# Patient Record
Sex: MALE | Race: WHITE | Employment: OTHER | URBAN - NONMETROPOLITAN AREA
[De-identification: names, ages, dates, MRNs, and addresses within clinical notes are randomized per-mention and may not be internally consistent; named-entity substitution may affect disease eponyms.]

---

## 2019-08-13 RX ORDER — LANCETS 30 GAUGE
1 EACH MISCELLANEOUS DAILY
COMMUNITY

## 2019-08-13 RX ORDER — FUROSEMIDE 20 MG/1
20 TABLET ORAL DAILY
COMMUNITY
End: 2021-11-11

## 2019-08-13 RX ORDER — GLIMEPIRIDE 2 MG/1
2 TABLET ORAL
COMMUNITY
End: 2023-05-25

## 2019-08-13 RX ORDER — ATORVASTATIN CALCIUM 10 MG/1
10 TABLET, FILM COATED ORAL DAILY
COMMUNITY

## 2019-08-13 RX ORDER — PEN NEEDLE, DIABETIC 31 G X1/4"
1 NEEDLE, DISPOSABLE MISCELLANEOUS DAILY
COMMUNITY

## 2019-08-13 RX ORDER — METOPROLOL TARTRATE 50 MG/1
50 TABLET, FILM COATED ORAL 2 TIMES DAILY
COMMUNITY
End: 2021-11-11

## 2019-08-27 ENCOUNTER — OFFICE VISIT (OUTPATIENT)
Dept: NEUROLOGY | Age: 74
End: 2019-08-27
Payer: MEDICARE

## 2019-08-27 VITALS
BODY MASS INDEX: 40.77 KG/M2 | HEART RATE: 84 BPM | HEIGHT: 68 IN | DIASTOLIC BLOOD PRESSURE: 70 MMHG | WEIGHT: 269 LBS | SYSTOLIC BLOOD PRESSURE: 130 MMHG

## 2019-08-27 DIAGNOSIS — R20.0 NUMBNESS AND TINGLING: ICD-10-CM

## 2019-08-27 DIAGNOSIS — R20.2 NUMBNESS AND TINGLING: ICD-10-CM

## 2019-08-27 DIAGNOSIS — I10 ESSENTIAL HYPERTENSION: ICD-10-CM

## 2019-08-27 DIAGNOSIS — G63 POLYNEUROPATHY ASSOCIATED WITH UNDERLYING DISEASE (HCC): ICD-10-CM

## 2019-08-27 DIAGNOSIS — R29.898 WEAKNESS OF RIGHT LOWER EXTREMITY: ICD-10-CM

## 2019-08-27 DIAGNOSIS — G93.89 OTHER SPECIFIED DISORDERS OF BRAIN: ICD-10-CM

## 2019-08-27 DIAGNOSIS — R41.3 MEMORY PROBLEM: ICD-10-CM

## 2019-08-27 DIAGNOSIS — I50.9 CONGESTIVE HEART FAILURE, UNSPECIFIED HF CHRONICITY, UNSPECIFIED HEART FAILURE TYPE (HCC): ICD-10-CM

## 2019-08-27 DIAGNOSIS — C64.9 MALIGNANT NEOPLASM OF KIDNEY, UNSPECIFIED LATERALITY (HCC): ICD-10-CM

## 2019-08-27 DIAGNOSIS — Z91.81 H/O FALLING: ICD-10-CM

## 2019-08-27 DIAGNOSIS — K21.9 GASTROESOPHAGEAL REFLUX DISEASE WITHOUT ESOPHAGITIS: ICD-10-CM

## 2019-08-27 DIAGNOSIS — E11.8 TYPE 2 DIABETES MELLITUS WITH COMPLICATION, WITHOUT LONG-TERM CURRENT USE OF INSULIN (HCC): ICD-10-CM

## 2019-08-27 DIAGNOSIS — I67.82 CHRONIC CEREBRAL ISCHEMIA: ICD-10-CM

## 2019-08-27 DIAGNOSIS — R26.89 BALANCE PROBLEM: ICD-10-CM

## 2019-08-27 DIAGNOSIS — M54.16 CHRONIC LUMBAR RADICULOPATHY: Primary | ICD-10-CM

## 2019-08-27 DIAGNOSIS — E66.09 CLASS 2 OBESITY DUE TO EXCESS CALORIES WITHOUT SERIOUS COMORBIDITY WITH BODY MASS INDEX (BMI) OF 35.0 TO 35.9 IN ADULT: ICD-10-CM

## 2019-08-27 DIAGNOSIS — I48.91 ATRIAL FIBRILLATION, UNSPECIFIED TYPE (HCC): ICD-10-CM

## 2019-08-27 PROBLEM — E66.812 CLASS 2 OBESITY DUE TO EXCESS CALORIES WITHOUT SERIOUS COMORBIDITY WITH BODY MASS INDEX (BMI) OF 35.0 TO 35.9 IN ADULT: Status: ACTIVE | Noted: 2019-08-27

## 2019-08-27 PROBLEM — E11.9 TYPE 2 DIABETES MELLITUS (HCC): Status: ACTIVE | Noted: 2019-08-27

## 2019-08-27 PROCEDURE — 99205 OFFICE O/P NEW HI 60 MIN: CPT | Performed by: PSYCHIATRY & NEUROLOGY

## 2019-08-27 ASSESSMENT — ENCOUNTER SYMPTOMS
VOMITING: 0
SINUS PRESSURE: 0
NAUSEA: 0
COUGH: 0
FACIAL SWELLING: 0
COLOR CHANGE: 0
SHORTNESS OF BREATH: 0
BLOOD IN STOOL: 0
APNEA: 0
EYE REDNESS: 0
CHOKING: 0
WHEEZING: 0
VOICE CHANGE: 0
TROUBLE SWALLOWING: 0
EYE DISCHARGE: 0
DIARRHEA: 0
SORE THROAT: 0
EYE PAIN: 0
ABDOMINAL PAIN: 0
EYE ITCHING: 0
BACK PAIN: 1
ABDOMINAL DISTENTION: 0
CONSTIPATION: 0
BOWEL INCONTINENCE: 0
CHEST TIGHTNESS: 0
PHOTOPHOBIA: 0
VISUAL CHANGE: 0

## 2019-08-27 NOTE — PATIENT INSTRUCTIONS
* FALL   PRECAUTIONS. *  USE   WALKING  ASSISTANCE  DEVICES     QUAD  CANE       *    TO   AVOID   TO  SLEEP  IN   SUPINE  POSITION. *      WEIGHT   LOSS. *   ADEQUATE   FLUID  INTAKE   AND  ELECTROLYTE  BALANCE           * KEEP  DAIRY  OF   THE  NEUROLOGICAL  SYMPTOMS          *  TO  MAINTAIN  REGULAR  SLEEP  WAKE  CYCLES. *   TO  HAVE  ADEQUATE  REST  AND   SLEEP    HOURS.        *    AVOID  USAGE OF   TOBACCO,  EXCESSIVE  ALCOHOL                AND   ILLEGAL   SUBSTANCES,  IF  ANY          *  Maintain   Healthy  Life Style    With   Periodic  Monitoring  Of      Any  Medical  Conditions  Including   Elevated  Blood  Pressure,  Lipid  Profile,     Blood  Sugar levels  And   Heart  Disease. *   Period   Screening  For  Cancers  Involving  Breast,  Colon,   lungs  And  Other  Organs  As  Applicable,  In consultation   With  Your  Primary Care Providers. *  If   There is  Any  Significant  Worsening   Of  Current  Symptoms  And  Or  If    Any additional  New  Neurological  Symptoms                 Or  Significant  Concerns   Should  Call  911 or  Go  To  Emergency  Department  For  Further  Immediate  Evaluation.

## 2019-08-27 NOTE — PROGRESS NOTES
History  Of   Visual  Symptoms    absent                  Associated   Diplopia       absent                                               Also   Additional   Symptoms   Present    As  Documented    In   The   detailed                  Review  Of  Systems   And    Please   Refer   To    Them for   Additional    Information. Any components  That are either  Unobtainable  Or  Limited  In   HPI, ROS  And/or PFSH   Are                   Due   ToPatient's  Medical  Problems,  Clinical  Condition   and/or lack of                                other    Alternate   resources. RECORDS   REVIEWED:    historical medical records       INFORMATION   REVIEWED:     MEDICAL   HISTORY,SURGICAL   HISTORY,     MEDICATIONS   LIST,   ALLERGIES AND  DRUG  INTOLERANCES,       FAMILY   HISTORY,  SOCIAL  HISTORY,      PROBLEM  LIST   FOR  PATIENT  CARE   COORDINATION      Past Medical History:   Diagnosis Date    [facility]     [facility]     Stage 3    CH[facility]congestive heart failure) [facility] 2018    GERD (gastroesophageal reflux disease)     Hypertension     Type 2 diabetes mellitus ([facility]          Past Surgical History:   Procedure Laterality Date    APPENDECTOMY      CIRCUMCISION  2014    FOOT FRACTURE SURGERY      Right foot, Broken ankle    TONSILLECTOMY           Current Outpatient Medications   Medication Sig Dispense Refill    atorvastatin (LIPITOR) 10 MG tablet Take 10 mg by mouth daily Half tablet daily      apixaban (ELIQUIS) 5 MG TABS tablet Take 5 mg by mouth 2 times daily      furosemide (LASIX) 20 MG tablet Take 20 mg by mouth daily      metoprolol tartrate (LOPRESSOR) 50 MG tablet Take 50 mg by mouth 2 times daily      ROSIGLITAZONE-GLIMEPIRIDE PO Take 45 mg by mouth daily      blood glucose test strips (GMATE BLOOD GLUCOSE TEST) strip 1 each by In Vitro route daily As needed.       Lancets MISC 1 each by Does not apply route daily      Insulin Pen Procedures    MRI Brain WO Contrast    VL DUP CAROTID BILATERAL    Sedimentation Rate    CBC    IRMA Screen with Reflex    Rheumatoid Factor    Hemoglobin A1C    TSH without Reflex    Vitamin B12 & Folate    EEG    EMG    DE TRANSCRAN DOPPLER INTRACRAN ART                             *  PATIENT  TO  RETURN  THE  CLINIC  AFTER   ABOVE,                       FOR   FURTHER    RE EVALUATIONS                               AND  ADDITIONAL  RECOMMENDATIONS ,                        INCLUDING  MEDICAL  MANAGEMENT,                        AS   CLINICALLY    INDICATED. *PATIENT   TO  FOLLOW  UP  WITH   PRIMARY  CARE         OTHER  CONSULTANTS  AS  BEFORE. *  Maintain   Healthy  Life Style    With   Periodic  Monitoring  Of      Any  Medical  Conditions  Including   Elevated  Blood  Pressure,  Lipid  Profile,     Blood  Sugar levels  AndHeart  Disease. *   Period   Screening  For  Cancers  Involving  Breast,  Colon,    lungs  And  Other  Organs  As  Applicable,  In consultation   With  Your  Primary Care Providers. *Second  Neurological  Opinion  And  Evaluations  In  [facility]  Setting  If  Patient  Is  Interested. * Please   Contact   Neurology  Clinic   Early   If   Are  Any  New  Neurological   And  Any neurological  Concerns. *  If  The  Patient remains  Neurologically  Stable   Return   To  [facility] Neurology Department   IN    1-2      MONTHS  TIME   FOR  FURTHER              FOLLOW UP.                       *   The  Neurological   Findings,  Possible  Diagnosis,  Differential diagnoses                    And  Options  For    Further   Investigations                   And  management   Are  Discussed  Comprehensively. Medications   And  Prescription   Risks  And  Side effects  Are   Also  Discussed.                      *  If   There is  Any to 2 drinks a day for men, 1drink a day for women) are okay. But drinking too much can lead to liver problems, high blood pressure, and other health problems.  health   If you have a family, there are many things you can do together to improve your health.  Eat meals together as a family as often as possible.  Eat healthy foods. This includes fruits, vegetables, lean meats and dairy, and whole grains.  Include your family in your fitness plan. Most peoplethink of activities such as jogging or tennis as the way to fitness, but there are many ways you and your family can be more active. Anything that makes you breathe hard and gets your heart pumping is exercise. Here are sometips:   Walk to do errands or to take your child to school or the bus.  Go for a family bike ride after dinner instead of watching TV.  Where can you learn more?  Go toSavoy [email or web address] and sign in to your [employer] account. Enter N026 in the Search HealthInformation box to learn more about \"A Healthy Lifestyle: Care Instructions. \"     If you do not have anaccount, please click on the \"Sign Up Now\" link.  Current as of: July 26, 2016   Content Version: 11.2   © 5631-1748 [employer]. Care instructions adapted under license by [facility] ([facility]). If you have questions about a medical condition or this instruction, always ask your healthcare professional. invi disclaims any warranty or liability for your use of this information.

## 2019-09-09 ENCOUNTER — HOSPITAL ENCOUNTER (OUTPATIENT)
Dept: LAB | Age: 74
Discharge: HOME | End: 2019-09-09
Payer: MEDICARE

## 2019-09-09 DIAGNOSIS — I67.82 CHRONIC CEREBRAL ISCHEMIA: ICD-10-CM

## 2019-09-09 DIAGNOSIS — R26.89 BALANCE PROBLEM: ICD-10-CM

## 2019-09-09 DIAGNOSIS — Z91.81 H/O FALLING: ICD-10-CM

## 2019-09-09 DIAGNOSIS — R41.3 MEMORY PROBLEM: ICD-10-CM

## 2019-09-09 LAB
ESTIMATED AVERAGE GLUCOSE: 151 MG/DL
FOLATE: >20 NG/ML
HBA1C MFR BLD: 6.9 % (ref 4.8–5.9)
HCT VFR BLD CALC: 44.4 % (ref 41–53)
HEMOGLOBIN: 14.7 G/DL (ref 13.5–17.5)
MCH RBC QN AUTO: 31.4 PG (ref 26–34)
MCHC RBC AUTO-ENTMCNC: 33.2 G/DL (ref 31–37)
MCV RBC AUTO: 94.5 FL (ref 80–100)
NRBC AUTOMATED: ABNORMAL PER 100 WBC
PDW BLD-RTO: 16.3 % (ref 11–14.5)
PLATELET # BLD: 163 K/UL (ref 140–450)
PMV BLD AUTO: 9.2 FL (ref 6–12)
RBC # BLD: 4.69 M/UL (ref 4.5–5.9)
RHEUMATOID FACTOR: <10 IU/ML
SEDIMENTATION RATE, ERYTHROCYTE: 9 MM (ref 0–20)
TSH SERPL DL<=0.05 MIU/L-ACNC: 3.66 MIU/L (ref 0.3–5)
VITAMIN B-12: 328 PG/ML (ref 232–1245)
WBC # BLD: 7 K/UL (ref 3.5–11)

## 2019-09-09 PROCEDURE — 82746 ASSAY OF FOLIC ACID SERUM: CPT

## 2019-09-09 PROCEDURE — 85027 COMPLETE CBC AUTOMATED: CPT

## 2019-09-09 PROCEDURE — 83036 HEMOGLOBIN GLYCOSYLATED A1C: CPT

## 2019-09-09 PROCEDURE — 85651 RBC SED RATE NONAUTOMATED: CPT

## 2019-09-09 PROCEDURE — 86431 RHEUMATOID FACTOR QUANT: CPT

## 2019-09-09 PROCEDURE — 36415 COLL VENOUS BLD VENIPUNCTURE: CPT

## 2019-09-09 PROCEDURE — 84443 ASSAY THYROID STIM HORMONE: CPT

## 2019-09-09 PROCEDURE — 82607 VITAMIN B-12: CPT

## 2019-09-09 PROCEDURE — 86038 ANTINUCLEAR ANTIBODIES: CPT

## 2019-09-10 LAB — ANTI-NUCLEAR ANTIBODY (ANA): NEGATIVE

## 2019-09-13 ENCOUNTER — HOSPITAL ENCOUNTER (OUTPATIENT)
Dept: MRI IMAGING | Age: 74
Discharge: HOME | End: 2019-09-15
Payer: MEDICARE

## 2019-09-13 ENCOUNTER — HOSPITAL ENCOUNTER (OUTPATIENT)
Dept: INTERVENTIONAL RADIOLOGY/VASCULAR | Age: 74
Discharge: HOME | End: 2019-09-15
Payer: MEDICARE

## 2019-09-13 DIAGNOSIS — Z91.81 H/O FALLING: ICD-10-CM

## 2019-09-13 DIAGNOSIS — R26.89 BALANCE PROBLEM: ICD-10-CM

## 2019-09-13 DIAGNOSIS — I67.82 CHRONIC CEREBRAL ISCHEMIA: ICD-10-CM

## 2019-09-13 DIAGNOSIS — R41.3 MEMORY PROBLEM: ICD-10-CM

## 2019-09-13 PROCEDURE — 93880 EXTRACRANIAL BILAT STUDY: CPT

## 2019-09-13 PROCEDURE — 70551 MRI BRAIN STEM W/O DYE: CPT

## 2019-09-18 ENCOUNTER — HOSPITAL ENCOUNTER (OUTPATIENT)
Dept: NEUROLOGY | Age: 74
Discharge: HOME | End: 2019-09-18
Payer: MEDICARE

## 2019-09-18 DIAGNOSIS — R26.89 BALANCE PROBLEM: ICD-10-CM

## 2019-09-18 DIAGNOSIS — R20.0 NUMBNESS AND TINGLING: ICD-10-CM

## 2019-09-18 DIAGNOSIS — R41.3 MEMORY PROBLEM: ICD-10-CM

## 2019-09-18 DIAGNOSIS — R29.898 WEAKNESS OF RIGHT LOWER EXTREMITY: ICD-10-CM

## 2019-09-18 DIAGNOSIS — R20.2 NUMBNESS AND TINGLING: ICD-10-CM

## 2019-09-18 DIAGNOSIS — I67.82 CHRONIC CEREBRAL ISCHEMIA: ICD-10-CM

## 2019-09-18 DIAGNOSIS — Z91.81 H/O FALLING: ICD-10-CM

## 2019-09-18 PROCEDURE — 95886 MUSC TEST DONE W/N TEST COMP: CPT

## 2019-09-18 PROCEDURE — 95911 NRV CNDJ TEST 9-10 STUDIES: CPT

## 2019-10-03 ENCOUNTER — HOSPITAL ENCOUNTER (OUTPATIENT)
Dept: NEUROLOGY | Age: 74
Discharge: HOME | End: 2019-10-03
Payer: MEDICARE

## 2019-10-03 DIAGNOSIS — I50.9 CONGESTIVE HEART FAILURE, UNSPECIFIED HF CHRONICITY, UNSPECIFIED HEART FAILURE TYPE (HCC): ICD-10-CM

## 2019-10-03 DIAGNOSIS — G93.89 OTHER SPECIFIED DISORDERS OF BRAIN: ICD-10-CM

## 2019-10-03 DIAGNOSIS — E11.8 TYPE 2 DIABETES MELLITUS WITH COMPLICATION, WITHOUT LONG-TERM CURRENT USE OF INSULIN (HCC): ICD-10-CM

## 2019-10-03 DIAGNOSIS — R26.89 BALANCE PROBLEM: ICD-10-CM

## 2019-10-03 DIAGNOSIS — M54.16 CHRONIC LUMBAR RADICULOPATHY: ICD-10-CM

## 2019-10-03 DIAGNOSIS — I67.82 CHRONIC CEREBRAL ISCHEMIA: ICD-10-CM

## 2019-10-03 DIAGNOSIS — Z91.81 H/O FALLING: ICD-10-CM

## 2019-10-03 DIAGNOSIS — R20.0 NUMBNESS AND TINGLING: ICD-10-CM

## 2019-10-03 DIAGNOSIS — R41.3 MEMORY PROBLEM: ICD-10-CM

## 2019-10-03 DIAGNOSIS — I10 ESSENTIAL HYPERTENSION: ICD-10-CM

## 2019-10-03 DIAGNOSIS — E66.09 CLASS 2 OBESITY DUE TO EXCESS CALORIES WITHOUT SERIOUS COMORBIDITY WITH BODY MASS INDEX (BMI) OF 35.0 TO 35.9 IN ADULT: ICD-10-CM

## 2019-10-03 DIAGNOSIS — K21.9 GASTROESOPHAGEAL REFLUX DISEASE WITHOUT ESOPHAGITIS: ICD-10-CM

## 2019-10-03 DIAGNOSIS — C64.9 MALIGNANT NEOPLASM OF KIDNEY, UNSPECIFIED LATERALITY (HCC): ICD-10-CM

## 2019-10-03 DIAGNOSIS — G63 POLYNEUROPATHY ASSOCIATED WITH UNDERLYING DISEASE (HCC): ICD-10-CM

## 2019-10-03 DIAGNOSIS — I48.91 ATRIAL FIBRILLATION, UNSPECIFIED TYPE (HCC): ICD-10-CM

## 2019-10-03 DIAGNOSIS — R29.898 WEAKNESS OF RIGHT LOWER EXTREMITY: ICD-10-CM

## 2019-10-03 DIAGNOSIS — R20.2 NUMBNESS AND TINGLING: ICD-10-CM

## 2019-10-03 PROCEDURE — 93892 TCD EMBOLI DETECT W/O INJ: CPT

## 2019-10-03 PROCEDURE — 95957 EEG DIGITAL ANALYSIS: CPT

## 2019-10-03 PROCEDURE — 93886 INTRACRANIAL COMPLETE STUDY: CPT

## 2019-10-03 PROCEDURE — 95813 EEG EXTND MNTR 61-119 MIN: CPT

## 2019-10-29 ENCOUNTER — OFFICE VISIT (OUTPATIENT)
Dept: NEUROLOGY | Age: 74
End: 2019-10-29
Payer: MEDICARE

## 2019-10-29 VITALS
RESPIRATION RATE: 16 BRPM | HEIGHT: 68 IN | BODY MASS INDEX: 40.76 KG/M2 | HEART RATE: 72 BPM | DIASTOLIC BLOOD PRESSURE: 72 MMHG | SYSTOLIC BLOOD PRESSURE: 130 MMHG | WEIGHT: 268.96 LBS

## 2019-10-29 DIAGNOSIS — R26.89 BALANCE PROBLEM: ICD-10-CM

## 2019-10-29 DIAGNOSIS — I48.91 ATRIAL FIBRILLATION, UNSPECIFIED TYPE (HCC): ICD-10-CM

## 2019-10-29 DIAGNOSIS — E66.09 CLASS 2 OBESITY DUE TO EXCESS CALORIES WITHOUT SERIOUS COMORBIDITY WITH BODY MASS INDEX (BMI) OF 35.0 TO 35.9 IN ADULT: ICD-10-CM

## 2019-10-29 DIAGNOSIS — R41.3 MEMORY PROBLEM: Primary | ICD-10-CM

## 2019-10-29 DIAGNOSIS — K21.9 GASTROESOPHAGEAL REFLUX DISEASE WITHOUT ESOPHAGITIS: ICD-10-CM

## 2019-10-29 DIAGNOSIS — I50.9 CONGESTIVE HEART FAILURE, UNSPECIFIED HF CHRONICITY, UNSPECIFIED HEART FAILURE TYPE (HCC): ICD-10-CM

## 2019-10-29 DIAGNOSIS — I10 ESSENTIAL HYPERTENSION: ICD-10-CM

## 2019-10-29 DIAGNOSIS — I65.23 BILATERAL CAROTID ARTERY STENOSIS: ICD-10-CM

## 2019-10-29 DIAGNOSIS — G63 POLYNEUROPATHY ASSOCIATED WITH UNDERLYING DISEASE (HCC): ICD-10-CM

## 2019-10-29 DIAGNOSIS — R73.09 ELEVATED HEMOGLOBIN A1C: ICD-10-CM

## 2019-10-29 DIAGNOSIS — Z91.81 H/O FALLING: ICD-10-CM

## 2019-10-29 DIAGNOSIS — R20.2 NUMBNESS AND TINGLING: ICD-10-CM

## 2019-10-29 DIAGNOSIS — E11.42 TYPE 2 DIABETES MELLITUS WITH DIABETIC POLYNEUROPATHY, WITHOUT LONG-TERM CURRENT USE OF INSULIN (HCC): ICD-10-CM

## 2019-10-29 DIAGNOSIS — M54.16 CHRONIC LUMBAR RADICULOPATHY: ICD-10-CM

## 2019-10-29 DIAGNOSIS — R29.898 WEAKNESS OF RIGHT LOWER EXTREMITY: ICD-10-CM

## 2019-10-29 DIAGNOSIS — R20.0 NUMBNESS AND TINGLING: ICD-10-CM

## 2019-10-29 DIAGNOSIS — I67.82 CHRONIC CEREBRAL ISCHEMIA: ICD-10-CM

## 2019-10-29 PROCEDURE — 99215 OFFICE O/P EST HI 40 MIN: CPT | Performed by: PSYCHIATRY & NEUROLOGY

## 2019-10-29 ASSESSMENT — ENCOUNTER SYMPTOMS
CHEST TIGHTNESS: 0
DIARRHEA: 0
EYE ITCHING: 0
PHOTOPHOBIA: 0
CONSTIPATION: 0
CHOKING: 0
BOWEL INCONTINENCE: 0
SINUS PRESSURE: 0
VISUAL CHANGE: 0
TROUBLE SWALLOWING: 0
BLOOD IN STOOL: 0
EYE PAIN: 0
NAUSEA: 0
COUGH: 0
BACK PAIN: 1
ABDOMINAL PAIN: 0
SHORTNESS OF BREATH: 0
FACIAL SWELLING: 0
COLOR CHANGE: 0
VOMITING: 0
ABDOMINAL DISTENTION: 0
EYE DISCHARGE: 0
EYE REDNESS: 0
APNEA: 0
WHEEZING: 0
SORE THROAT: 0
VOICE CHANGE: 0

## 2020-04-30 ENCOUNTER — OFFICE VISIT (OUTPATIENT)
Dept: NEUROLOGY | Age: 75
End: 2020-04-30
Payer: MEDICARE

## 2020-04-30 VITALS
TEMPERATURE: 97 F | OXYGEN SATURATION: 97 % | HEIGHT: 68 IN | SYSTOLIC BLOOD PRESSURE: 122 MMHG | DIASTOLIC BLOOD PRESSURE: 70 MMHG | HEART RATE: 60 BPM | WEIGHT: 280 LBS | BODY MASS INDEX: 42.44 KG/M2

## 2020-04-30 PROCEDURE — 99213 OFFICE O/P EST LOW 20 MIN: CPT

## 2020-04-30 PROCEDURE — 99215 OFFICE O/P EST HI 40 MIN: CPT | Performed by: PSYCHIATRY & NEUROLOGY

## 2020-04-30 RX ORDER — CHOLECALCIFEROL (VITAMIN D3) 1250 MCG
CAPSULE ORAL
COMMUNITY

## 2020-04-30 ASSESSMENT — ENCOUNTER SYMPTOMS
DIARRHEA: 0
EYE REDNESS: 0
BOWEL INCONTINENCE: 0
SORE THROAT: 0
CHOKING: 0
COUGH: 0
VOICE CHANGE: 0
NAUSEA: 0
VOMITING: 0
CONSTIPATION: 0
WHEEZING: 0
SINUS PRESSURE: 0
CHEST TIGHTNESS: 0
PHOTOPHOBIA: 0
BLOOD IN STOOL: 0
BACK PAIN: 1
VISUAL CHANGE: 0
EYE PAIN: 0
EYE ITCHING: 0
ABDOMINAL PAIN: 0
FACIAL SWELLING: 0
APNEA: 0
SHORTNESS OF BREATH: 0
EYE DISCHARGE: 0
ABDOMINAL DISTENTION: 0
TROUBLE SWALLOWING: 0
COLOR CHANGE: 0

## 2020-04-30 NOTE — PROGRESS NOTES
[facility]  Neurology  [address] [address]  IQQXA:[phone]   Fax: [phone]    SUBJECTIVE:     PATIENT ID:  Herminia Powell is a  RIGHT     HANDED 70 y.o. male. Back Pain   This is a chronic problem. The current episode started more than 1 year ago. The problem occurs intermittently. The problem has been gradually worsening since onset. The pain is present in the lumbar spine. The quality of the pain is described as aching and cramping. The pain does not radiate. The pain is at a severity of 3/10. The pain is mild. The pain is worse during the day. The symptoms are aggravated by standing and sitting. Stiffness is present all day. Associated symptoms include numbness, paresthesias, tingling and weakness. Pertinent negatives include no abdominal pain, bladder incontinence, bowel incontinence, chest pain, dysuria, fever, headaches, leg pain, paresis, pelvic pain, perianal numbness or weight loss. Risk factors include obesity, lack of exercise and sedentary lifestyle. He has tried home exercises and bed rest for the symptoms. The treatment provided mild relief. Neurologic Problem   The patient's primary symptoms include clumsiness, focal sensory loss, focal weakness, a loss of balance, memory loss and weakness. The patient's pertinent negatives include no altered mental status, near-syncope, slurred speech, syncope or visual change. This is a chronic problem. Episode onset: MORE    THAN   2  YEARS. The neurological problem developed gradually. The problem has been waxing and waning since onset. There was lower extremity, left-sided and right-sided focality noted. Associated symptoms include back pain and vertigo.  Pertinent negatives include no abdominal pain, auditory change, aura, bladder incontinence, bowel incontinence, chest pain, confusion, diaphoresis, dizziness, fatigue, fever, headaches, light-headedness, nausea, neck pain, palpitations, shortness of breath or PRECIPITATING  FACTORS: including  fever/infection, exertion/relaxation, position change, stress, weather change,                        medications/alcohol, time of day/darkness/light  Are  absent                                                              MODIFYING  FACTORS:  fever/infection, exertion/relaxation, position change, stress, weather change,                        medications/alcohol, time of day/darkness/light  Are  absent             Patient   Indicates   The  Presence   And  The  Absence  Of  The  Following    Associated  And   Additional  Neurological    Symptoms:                                Balance  And coordination   problems  present           Gait problems     present            Headaches      absent              Migraines           absent           Memory problemspresent              Confusion        absent            Paresthesia   numbness          present           Seizures  And  Starring  Episodes           absent           Syncope,  Near  syncopal episodes         absent           Speech   problems           absent             Swallowing   Problems      absent            Dizziness,  Light headedness           absent              Vertigo        absent             Generalized   Weakness    absent              focal  Weakness     present             Tremors         absent              Sleep  Problems     absent             History  Of   Recent  Head  Injury     absent             History  Of   Recent  TIA     absent             History  Of   Recent    Stroke     absent             Neck  Pain   and   Neck muscle  Spasms  absent               Radiating  down   And   Weakness           absent            Lower back   Pain  And     Spasms  present              Radiating    Down   And   Weakness          present                H/OFALLS        present               History  Of   Visual  Symptoms    absent                  Associated   Diplopia suicidal plan. Cognition and Memory: Memory is impaired. He does not exhibit impaired recent memory or impaired remote memory. Judgment: Judgment is not impulsive or inappropriate. Neurologic Exam      NEUROLOGICALEXAMINATION :      A) MENTAL STATUS:                   Alert and  oriented  To time, place  And  Person. No Aphasia. No  Dysarthria. Able   To  Follow   SIMPLE    commands   without   Any  Difficulty. No right  To left confusion. Normal  Speech  And language function. Insight and  Judgment ,Fund  Of  Knowledge   within normal limits                Recent  And  Remote memory  DECREASED                Attention &  Concentration are    DECREASED                                                 B) CRANIAL NERVES :      CN : Visual  Acuity  And  Visual fields  within normal limits               Fundi  Could  Not  Be  Could  Not  Be  Evaluated. 3,4,6 CN : Both  Pupils are   Reactive and  Equal.  Movements  Are  Intact. No  Nystagmus. No  VASHTI. No  Afferent  Pupillary  Defect noted. 5 CN :  Normal  Facial sensations and Corneal  Reflexes           7 CN:  Normal  Facial  Symmetry  And  Strength. No facial  Weakness. 8 CN :  Hearing  Appears    DECREASED            9, 10 CN: Normal   spontaneous, reflex   palate   movements         11 CN:   Normal  Shoulder  shrug and  strength         12 CN :   Normal  Tongue movements and  Tongue  In midline                        No tongue   Fasciculations or atrophy       C) MOTOR  EXAM:                 Strength  In upper  5/5      And  Lower   extremities  3+/5   RIGHT     AND     4+/5   IN  LEFT                    Rapid   alternating  And  repetitions  Movements    DECREASED                   Muscle  Tone  In upper  And  Lower  Extremities  normal                No rigidity. No  Spasticity. Bradykinesia   absent               No  Asterixis. Sustention  Tremor , Resting   Tremor   absent                    No   other  Abnormal  Movements noted           D) SENSORY :               Light   touch, pinprick,   position  And  Vibration   IMPAIRED  IN                             GLOVE  AND  STOCKING  MANNER        E) REFLEXES:                   Deep  Tendon  Reflexes     DECREASED                    No  pathological  Reflexes  Bilaterally. F) COORDINATION  AND  GAIT :                                Station and  Gait    SLOW                                Romberg 's test   POSITIVE                            Ataxia negative          ASSESSMENT:      Patient Active Problem List   Diagnosis    [facility]    Hypertension    CHF (congestive heart failure) (HCC)    Atrial fibrillation (HCC)    GERD (gastroesophageal reflux disease)    Chronic lumbar radiculopathy    Class 2 obesity due to excess calories without serious comorbidity with body mass index (BMI) of 35.0 to 35.9 in adult    Weakness of right lower extremity    Numbness and tingling    Polyneuropathy associated with underlying disease ([facility]    H/O falling    Balance problem    Chronic cerebral ischemia    Memory problem    Bilateral carotid artery stenosis    Elevated hemoglobin A1c           MRI OF THE BRAIN WITHOUT CONTRAST  9/13/2019 10:50 am       TECHNIQUE:   Multiplanar multisequence MRI of the brain was performed without the   administration of intravenous contrast.       COMPARISON:   None.       HISTORY:   ORDERING SYSTEM PROVIDED HISTORY: H/O falling   TECHNOLOGIST PROVIDED HISTORY:   Reason for Exam:  Loss of feeling in right leg for one to two years.    Acuity: Chronic   Type of Exam: Unknown   Additional signs and symptoms: H/O falling; Balance problem; Chronic cerebral   ischemia; Memory problem.       FINDINGS:   INTRACRANIAL STRUCTURES/VENTRICLES: The sellar some benefits soon after you stop using tobacco. If you have shortness of breath or asthma symptoms, they will likely getbetter within a few weeks after you quit.  Limit how much alcohol you drink. Moderate amounts of alcohol (up to 2 drinks a day for men, 1drink a day for women) are okay. But drinking too much can lead to liver problems, high blood pressure, and other health problems.  health   If you have a family, there are many things you can do together to improve your health.  Eat meals together as a family as often as possible.  Eat healthy foods. This includes fruits, vegetables, lean meats and dairy, and whole grains.  Include your family in your fitness plan. Most peoplethink of activities such as jogging or tennis as the way to fitness, but there are many ways you and your family can be more active. Anything that makes you breathe hard and gets your heart pumping is exercise. Here are sometips:   Walk to do errands or to take your child to school or the bus.  Go for a family bike ride after dinner instead of watching TV.  Where can you learn more?  Go toCoherent Labstps://[email or web address] & [email or web address]. org and sign in to your [employer] account. Enter Z296 in the Search HealthInformation box to learn more about \"A Healthy Lifestyle: Care Instructions. \"     If you do not have anaccount, please click on the \"Sign Up Now\" link.  Current as of: July 26, 2016   Content Version: 11.2   © 9230-5498 [employer]Sight. Care instructions adapted under license by [facility] ([facility]). If you have questions about a medical condition or this instruction, always ask your healthcare professional. Aspida disclaims any warranty or liability for your use of this information.

## 2020-04-30 NOTE — PATIENT INSTRUCTIONS
[facility] NEUROLOGY    Due to the complex nature of our neurological testing, It is the policy of the Neurology Department not to release the results of your testing over the phone. Once all testing is completed and we have all the results back, Dr. Libia Short will then personally go over all the results with you and answer any questions that you may have during a follow up appointment. If you are unable to keep this appointment, please notify the [address] [place] [address] @ [phone], as soon as possible. Please bring your prescription bottles to all appointments. Advance Care Planning  People with COVID-19 may have no symptoms, mild symptoms, such as fever, cough, and shortness of breath or they may have more severe illness, developing severe and fatal pneumonia. As a result, Advance Care Planning with attention to naming a health care decision maker (someone you trust to make healthcare decisions for you if you could not speak for yourself) and sharing other health care preferences is important BEFORE a possible health crisis. Please contact your Primary Care Provider to discuss Advance Care Planning. Preventing the Spread of Coronavirus Disease 2019 in Homes and Residential Communities  For the most recent information go to [email or web address].fi    Prevention steps for People with confirmed or suspected COVID-19 (including persons under investigation) who do not need to be hospitalized  and   People with confirmed COVID-19 who were hospitalized and determined to be medically stable to go home    Your healthcare provider and public health staff will evaluate whether you can be cared for at home. If it is determined that you do not need to be hospitalized and can be isolated at home, you will be monitored by staff from your local or state health department.  You should follow the prevention steps below until a healthcare provider or local or Spoke to shelli villalobos    [facility] health department says you can return to your normal activities. Stay home except to get medical care  People who are mildly ill with COVID-19 are able to isolate at home during their illness. You should restrict activities outside your home, except for getting medical care. Do not go to work, school, or public areas. Avoid using public transportation, ride-sharing, or taxis. Separate yourself from other people and animals in your home  People: As much as possible, you should stay in a specific room and away from other people in your home. Also, you should use a separate bathroom, if available. Animals: You should restrict contact with pets and other animals while you are sick with COVID-19, just like you would around other people. Although there have not been reports of pets or other animals becoming sick with COVID-19, it is still recommended that people sick with COVID-19 limit contact with animals until more information is known about the virus. When possible, have another member of your household care for your animals while you are sick. If you are sick with COVID-19, avoid contact with your pet, including petting, snuggling, being kissed or licked, and sharing food. If you must care for your pet or be around animals while you are sick, wash your hands before and after you interact with pets and wear a facemask. Call ahead before visiting your doctor  If you have a medical appointment, call the healthcare provider and tell them that you have or may have COVID-19. This will help the healthcare providers office take steps to keep other people from getting infected or exposed. Wear a facemask  You should wear a facemask when you are around other people (e.g., sharing a room or vehicle) or pets and before you enter a healthcare providers office.  If you are not able to wear a facemask (for example, because it causes trouble breathing), then people who live with you should not stay in the same room with

## 2020-11-04 ENCOUNTER — OFFICE VISIT (OUTPATIENT)
Dept: NEUROLOGY | Age: 75
End: 2020-11-04
Payer: MEDICARE

## 2020-11-04 VITALS
SYSTOLIC BLOOD PRESSURE: 118 MMHG | DIASTOLIC BLOOD PRESSURE: 70 MMHG | HEART RATE: 53 BPM | WEIGHT: 281 LBS | HEIGHT: 67 IN | OXYGEN SATURATION: 98 % | BODY MASS INDEX: 44.1 KG/M2

## 2020-11-04 PROBLEM — E11.42 TYPE 2 DIABETES MELLITUS WITH DIABETIC POLYNEUROPATHY, WITHOUT LONG-TERM CURRENT USE OF INSULIN (HCC): Status: ACTIVE | Noted: 2020-11-04

## 2020-11-04 PROCEDURE — 99215 OFFICE O/P EST HI 40 MIN: CPT | Performed by: PSYCHIATRY & NEUROLOGY

## 2020-11-04 PROCEDURE — 99214 OFFICE O/P EST MOD 30 MIN: CPT | Performed by: PSYCHIATRY & NEUROLOGY

## 2020-11-04 RX ORDER — LEVOTHYROXINE SODIUM 0.05 MG/1
100 TABLET ORAL
COMMUNITY

## 2020-11-04 RX ORDER — AMIODARONE HYDROCHLORIDE 200 MG/1
200 TABLET ORAL 2 TIMES DAILY
COMMUNITY
Start: 2020-08-05

## 2020-11-04 RX ORDER — TORSEMIDE 20 MG/1
40 TABLET ORAL DAILY
COMMUNITY
Start: 2020-04-23 | End: 2021-11-11

## 2020-11-04 RX ORDER — TRIAMCINOLONE ACETONIDE 5 MG/G
CREAM TOPICAL
COMMUNITY
Start: 2020-08-17 | End: 2021-11-11

## 2020-11-04 ASSESSMENT — ENCOUNTER SYMPTOMS
ABDOMINAL PAIN: 0
TROUBLE SWALLOWING: 0
CHOKING: 0
VOICE CHANGE: 0
NAUSEA: 0
VISUAL CHANGE: 0
ABDOMINAL DISTENTION: 0
SINUS PRESSURE: 0
COUGH: 0
BLOOD IN STOOL: 0
EYE DISCHARGE: 0
PHOTOPHOBIA: 0
FACIAL SWELLING: 0
SHORTNESS OF BREATH: 0
CHEST TIGHTNESS: 0
CONSTIPATION: 0
DIARRHEA: 0
BOWEL INCONTINENCE: 0
BACK PAIN: 1
COLOR CHANGE: 0
APNEA: 0
EYE ITCHING: 0
EYE PAIN: 0
SORE THROAT: 0
WHEEZING: 0
EYE REDNESS: 0
VOMITING: 0

## 2020-11-04 NOTE — PROGRESS NOTES
SUBJECTIVE:   Bibi Song is a 52 year old female who presents to clinic today for the following health issues:      ABDOMINAL PAIN     Onset: 1 1/2 months    Description:   Character: Cramping  Location: left lower quadrant annabel-umbilical region  Radiation: None    Intensity: moderate    Progression of Symptoms:  worsening and intermittent    Accompanying Signs & Symptoms:  Fever/Chills?: no   Gas/Bloating: YES  Nausea: no   Vomitting: no   Diarrhea?: YES  Constipation:no   Dysuria or Hematuria: YES   History:   Trauma: no   Previous similar pain: YES   Previous tests done: Colonoscopy    Precipitating factors:   Does the pain change with:     Food: YES     BM: YES- feels better for little bit    Urination: no     Alleviating factors:  Not eating or drinking anything    Therapies Tried and     outcome: Rx suppositories     LMP:  6/18/18   History of ulcerative colitis which was diagnosed in 1995.    Her last flare was a few years ago.  As noted some blood with BMs, diarrhea, bloating, flatulence.  Describes the discomfort as an ache not cramping.    Has intentionally been losing weight by walking more and watching her diet.  She has lost 17 pounds over the past 6 weeks.  Last colonoscopy in 2010.    History of depression and OCD  Stable on sertraline and bupropion.  She has no concerns.        Problem list and histories reviewed & adjusted, as indicated.  Additional history: as documented    Patient Active Problem List   Diagnosis     Moderate major depression (H)     OCD (obsessive compulsive disorder)     Anxiety disorder     Family history of diabetes mellitus     Family history of thyroid disease     ALCOHOL ABUSE, IN REMISSION     Hyperlipidemia LDL goal <160     Cervicalgia     Hypothyroidism, unspecified type     Ulcerative colitis with complication, unspecified location (H)     History reviewed. No pertinent surgical history.    Social History   Substance Use Topics     Smoking status: Former Smoker  [facility]  Neurology  [address] [place] [address]  VINTB:[phone]   Fax: [phone]    SUBJECTIVE:     PATIENT ID:  Lizeth Keller is a  RIGHT     HANDED 70 y.o. male. Back Pain   This is a chronic problem. The current episode started more than 1 year ago. The problem occurs intermittently. The problem has been gradually worsening since onset. The pain is present in the lumbar spine. The quality of the pain is described as aching and cramping. The pain does not radiate. The pain is at a severity of 3/10. The pain is mild. The pain is worse during the day. The symptoms are aggravated by standing and sitting. Stiffness is present all day. Associated symptoms include numbness, paresthesias, tingling and weakness. Pertinent negatives include no abdominal pain, bladder incontinence, bowel incontinence, chest pain, dysuria, fever, headaches, leg pain, paresis, pelvic pain, perianal numbness or weight loss. Risk factors include obesity, lack of exercise and sedentary lifestyle. He has tried home exercises and bed rest for the symptoms. The treatment provided mild relief. Neurologic Problem   The patient's primary symptoms include clumsiness, focal sensory loss, focal weakness, a loss of balance, memory loss and weakness. The patient's pertinent negatives include no altered mental status, near-syncope, slurred speech, syncope or visual change. This is a chronic problem. Episode onset: MORE    THAN   2  YEARS. The neurological problem developed gradually. The problem has been waxing and waning since onset. There was lower extremity, left-sided and right-sided focality noted. Associated symptoms include back pain and vertigo.  Pertinent negatives include no abdominal pain, auditory change, aura, bladder incontinence, bowel incontinence, chest pain, confusion, diaphoresis, dizziness, fatigue, fever, headaches, light-headedness, nausea, neck pain, palpitations, shortness of breath or     Quit date: 1/1/1994     Smokeless tobacco: Never Used     Alcohol use No      Comment: Treatment in 2007     Family History   Problem Relation Age of Onset     Diabetes Mother      Depression Mother      OCD     Neurologic Disorder Mother      Psychotic Disorder Mother      Respiratory Mother      Thyroid Disease Mother      Gynecology Mother      hysterectomy     Cerebrovascular Disease Mother      pacemaker     HEART DISEASE Father      MI,   LATE 60'S     Hypertension Father      Prostate Cancer Father      Obesity Father      Diabetes Father      Cerebrovascular Disease Father      Depression Brother      Thyroid Disease Brother      Depression Brother      Thyroid Disease Brother      Allergies Son      Asthma Son      Thyroid Disease Maternal Grandmother      Cancer Maternal Grandfather      Psychotic Disorder Paternal Grandmother      Thyroid Disease Paternal Grandfather      Diabetes Brother      Breast Cancer Maternal Aunt          Current Outpatient Prescriptions   Medication Sig Dispense Refill     levothyroxine (SYNTHROID/LEVOTHROID) 75 MCG tablet Take 1 tablet (75 mcg) by mouth daily 90 tablet 0     melatonin 3 MG tablet Take 9 mg by mouth nightly as needed. 30 tablet 0     mesalamine (CANASA) 1000 MG Suppository Place 1 suppository (1,000 mg) rectally At Bedtime 42 suppository 0     sertraline (ZOLOFT) 100 MG tablet Take 3 tablets (300 mg) by mouth daily 1 tablet 0     ziprasidone (GEODON) 40 MG capsule Take 2 capsules (80 mg) by mouth daily 1 capsule      baclofen (LIORESAL) 10 MG tablet Take 0.5-1 tablets (5-10 mg) by mouth 3 times daily as needed for muscle spasms Due for visit (Patient not taking: Reported on 7/10/2018) 30 tablet 0     buPROPion (WELLBUTRIN XL) 300 MG 24 hr tablet Take 300 mg by mouth every morning         Reviewed and updated as needed this visit by clinical staff  Tobacco  Allergies  Meds  Problems  Med Hx  Surg Hx  Fam Hx  Soc Hx        Reviewed and updated as needed  "this visit by Provider  Allergies  Meds  Problems         ROS:  Constitutional, HEENT, cardiovascular, pulmonary, gi and gu systems are negative, except as otherwise noted.    OBJECTIVE:     /71 (BP Location: Right arm, Patient Position: Chair, Cuff Size: Adult Large)  Pulse 79  Temp 96.9  F (36.1  C) (Tympanic)  Resp 12  Ht 5' 5\" (1.651 m)  Wt 236 lb (107 kg)  LMP 06/18/2018 (Approximate)  SpO2 93%  Breastfeeding? No  BMI 39.27 kg/m2  Body mass index is 39.27 kg/(m^2).  GENERAL: healthy, alert and no distress  EYES: Eyes grossly normal to inspection and conjunctivae and sclerae normal  RESP: lungs clear to auscultation - no rales, rhonchi or wheezes  CV: regular rate and rhythm, normal S1 S2, no S3 or S4, no murmur, click or rub, no peripheral edema and peripheral pulses strong  MS: no gross musculoskeletal defects noted, no edema  PSYCH: mentation appears normal, affect normal/bright    Diagnostic Test Results:  none     ASSESSMENT/PLAN:     ASSESSMENT:  1. Ulcerative colitis with rectal bleeding, unspecified location (H).  Will treat with mesalamine suppositories.  Follow-up colonoscopy.   2. Moderate major depression (H).Stable.  No change in plan of care.  Depression action plan reviewed and given to patient.       PLAN:  Orders Placed This Encounter     DEPRESSION ACTION PLAN (DAP)     GASTROENTEROLOGY ADULT REF PROCEDURE ONLY [facility] [phone]     mesalamine (CANASA) 1000 MG Suppository       Patient Instructions   Use the suppository at bedtime for 6 weeks    If no improvement, call the clinic    Schedule the colonoscopy  Patient agrees with plan of care as outlined. Call or return to the clinic with any worsening of symptoms or no resolution. Medication side effects reviewed.  Chart documentation with Dragon Voice recognition Software. Although reviewed after completion, some words and grammatical errors may remain.        Jacinta Nagel, NP, APRN [facility] " -    STABLE                                   NO  CORRECTABLE  ETIOLOGIES                                  PATIENT  NOT  INTERESTED    IN    MEDICATION  FOR                                         HIS   COGNITIVE  PROBLEMS. 8)     H/O   MULTIPLE  CO  MORBID  MEDICAL  CONDITIONS                                      BEING  FOLLOWED  BY  HIS    PCP                                 9)    H/O   CHRONIC       ATRIAL  FIBRILLATION                                         -   ON    ELIQUIS                                  BEING  FOLLOWED  BY   HIS   CARDIOLOGY                                10)     HAD   EVALUATIONS      AND  THERAPY                                         WITH   DR. MIGUEL                                 11)         D/D      INCLUDE    STROKE,    CEREBRAL  ISCHEMIA                                              RADICULOPATHY,  PLEXOPATHY                                                12)        HAD    NEURO  DIAGNOSTIC  EVALUATIONS  IN  SEPT. 2019                                     A) MRI  BRAIN     SHOWED  NO  ACUTE  PATHOLOGY                                      B)  CAROTID  DOPPLER     SHOWED     50 - 69  %    LEFT   STENOSIS                                                          -   NEEDS  MONITORING                                         C)     EMG    BOTH  LOWER  EXTREMITIES       SHOWED                                                             MODERATE  PERIPHERAL POLYNEUROPATHY                                                           AND  CHRONIC  LOWER  LUMBAR  RADICULOPATHY                                              13)     PATIENT  DENIES   ANY   NEW  NEUROLOGICAL  CONCERNS. 14)     VARIOUS  RISK   FACTORS   WERE  REVIEWED   AND   DISCUSSED. PATIENT   HAS  MULTIPLE   MEDICAL, NEUROLOGICAL      PROBLEMS . [facility]   [facility]       absent                                               Also   Additional   Symptoms   Present    As  Documented    In   The   detailed                  Review  Of  Systems   And    Please   Refer   To    Them for   Additional    Information. Any components  That are either  Unobtainable  Or  Limited  In   HPI, ROS  And/or PFSH   Are                   Due   ToPatient's  Medical  Problems,  Clinical  Condition   and/or lack of                                other    Alternate   resources.           RECORDS   REVIEWED:    historical medical records       INFORMATION   REVIEWED:     MEDICAL   HISTORY,SURGICAL   HISTORY,     MEDICATIONS   LIST,   ALLERGIES AND  DRUG  INTOLERANCES,       FAMILY   HISTORY,  SOCIAL  HISTORY,      PROBLEM  LIST   FOR  PATIENT  CARE   COORDINATION      Past Medical History:   Diagnosis Date    [facility]     [facility]     Stage 3    CHF (congestive heart failure) ([facility] 2018    GERD (gastroesophageal reflux disease)     Hypertension     Type 2 diabetes mellitus ([facility]          Past Surgical History:   Procedure Laterality Date    APPENDECTOMY      CIRCUMCISION  2014    FOOT FRACTURE SURGERY      Right foot, Broken ankle    TONSILLECTOMY           Current Outpatient Medications   Medication Sig Dispense Refill    levothyroxine (SYNTHROID) 50 MCG tablet Take 50 mcg by mouth every morning (before breakfast)      amiodarone (CORDARONE) 200 MG tablet Take 200 mg by mouth 2 times daily      triamcinolone (ARISTOCORT) 0.5 % cream apply to affected area twice a day      torsemide (DEMADEX) 20 MG tablet Take 40 mg by mouth daily      Cholecalciferol (VITAMIN D3) 1.25 MG (59720 UT) CAPS Take by mouth      Multiple Vitamins-Minerals (MULTIVITAMIN ADULT PO) Take by mouth      atorvastatin (LIPITOR) 10 MG tablet Take 10 mg by mouth daily Half tablet daily      apixaban (ELIQUIS) 5 MG TABS tablet Take 5 mg by mouth 2 times daily      furosemide (LASIX) 20 MG tablet Take 20 mg by mouth daily      metoprolol tartrate (LOPRESSOR) 50 MG tablet Take 50 mg by mouth 2 times daily      blood glucose test strips (GMATE BLOOD GLUCOSE TEST) strip 1 each by In Vitro route daily As needed.  Lancets MISC 1 each by Does not apply route daily      Insulin Pen Needle (PEN NEEDLES) 31G X 6 MM MISC 1 each by Does not apply route daily      Insulin Pen Needle (B-D UF III MINI PEN NEEDLES) 31G X 5 MM MISC 1 each by Does not apply route daily      glimepiride (AMARYL) 2 MG tablet Take 2 mg by mouth every morning (before breakfast) Take half tablet to 1 tablet every morning. Hold if blood sugar going low.  Liraglutide (VICTOZA SC) Inject 1.8 mg into the skin daily      dronedarone hcl (MULTAQ) 400 MG TABS Take 400 mg by mouth 2 times daily (with meals)      ROSIGLITAZONE-GLIMEPIRIDE PO Take 45 mg by mouth daily       No current facility-administered medications for this visit.           No Known Allergies      Family History   Problem Relation Age of Onset    Stroke Mother     Heart Disease Father     Heart Attack Father          Social History     Socioeconomic History    Marital status:      Spouse name: Not on file    Number of children: Not on file    Years of education: Not on file    Highest education level: Not on file   Occupational History    Not on file   Social Needs    Financial resource strain: Not on file    Food insecurity     Worry: Not on file     Inability: Not on file    Transportation needs     Medical: Not on file     Non-medical: Not on file   Tobacco Use    Smoking status: Former Smoker     Years: 10.00    Smokeless tobacco: Never Used    Tobacco comment: Age 14 to 31's   Substance and Sexual Activity    Alcohol use: Not on file    Drug use: Not on file    Sexual activity: Not on file   Lifestyle    Physical activity     Days per week: Not on file     Minutes per session: Not on file    Stress: Not on file   Relationships    Social connections     Talks on phone: Not on file     Gets together: Not on file     Attends [employer] service: Not on file     Active member of club or organization: Not on file     Attends meetings of clubs or organizations: Not on file     Relationship status: Not on file    Intimate partner violence     Fear of current or ex partner: Not on file     Emotionally abused: Not on file     Physically abused: Not on file     Forced sexual activity: Not on file   Other Topics Concern    Not on file   Social History Narrative    Not on file       Vitals:    11/04/20 1008   BP: 118/70   Pulse: 53   SpO2: 98%         Wt Readings from Last 3 Encounters:   11/04/20 281 lb (127.5 kg)   04/30/20 280 lb (127 kg)   10/29/19 268 lb 15.4 oz (122 kg)         BP Readings from Last 3 Encounters:   11/04/20 118/70   04/30/20 122/70   10/29/19 130/72         Review of Systems   Constitutional: Negative for appetite change, chills, diaphoresis, fatigue, fever, unexpected weight change and weight loss. HENT: Negative for congestion, dental problem, drooling, ear discharge, ear pain, facial swelling, hearing loss, mouth sores, nosebleeds, postnasal drip, sinus pressure, sore throat, tinnitus, trouble swallowing and voice change. Eyes: Negative for photophobia, pain, discharge, redness, itching and visual disturbance. Respiratory: Negative for apnea, cough, choking, chest tightness, shortness of breath and wheezing. Cardiovascular: Negative for chest pain, palpitations, leg swelling and near-syncope. Gastrointestinal: Negative for abdominal distention, abdominal pain, blood in stool, bowel incontinence, constipation, diarrhea, nausea and vomiting. Endocrine: Negative for cold intolerance, heat intolerance, polydipsia, polyphagia and polyuria. Genitourinary: Negative for bladder incontinence, dysuria and pelvic pain.    Musculoskeletal: Positive for back pain and gait problem. Negative for arthralgias, joint swelling, myalgias, neck pain and neck stiffness. Skin: Negative for color change, pallor, rash and wound. Allergic/Immunologic: Negative for environmental allergies, food allergies and immunocompromised state. Neurological: Positive for vertigo, tingling, focal weakness, weakness, numbness, paresthesias and loss of balance. Negative for dizziness, tremors, seizures, syncope, facial asymmetry, speech difficulty, light-headedness and headaches. Hematological: Negative for adenopathy. Does not bruise/bleed easily. Psychiatric/Behavioral: Positive for decreased concentration and memory loss. Negative for agitation, behavioral problems, confusion, dysphoric mood, hallucinations, self-injury, sleep disturbance and suicidal ideas. The patient is not nervous/anxious and is not hyperactive. OBJECTIVE:    Physical Exam  Constitutional:       Appearance: He is well-developed. HENT:      Head: Normocephalic and atraumatic. No raccoon eyes or Ritter's sign. Right Ear: External ear normal.      Left Ear: External ear normal.      Nose: Nose normal.   Eyes:      Conjunctiva/sclera: Conjunctivae normal.   Neck:      Musculoskeletal: Normal range of motion and neck supple. Normal range of motion. No neck rigidity or muscular tenderness. Thyroid: No thyroid mass or thyromegaly. Vascular: No carotid bruit. Trachea: No tracheal deviation. Meningeal: Brudzinski's sign and Kernig's sign absent. Cardiovascular:      Rate and Rhythm: Normal rate and regular rhythm. Pulmonary:      Effort: Pulmonary effort is normal.   Musculoskeletal:         General: No tenderness. Skin:     General: Skin is warm. Coloration: Skin is not pale. Findings: No erythema or rash. Nails: There is no clubbing. Psychiatric:         Attention and Perception: He is attentive. Mood and Affect: Mood is not anxious or depressed.  Affect is not labile, blunt, angry or inappropriate. Behavior: Behavior is not agitated, slowed, aggressive, withdrawn, hyperactive or combative. Behavior is cooperative. Thought Content: Thought content is not paranoid or delusional. Thought content does not include homicidal or suicidal ideation. Thought content does not include homicidal or suicidal plan. Cognition and Memory: Memory is impaired. He does not exhibit impaired recent memory or impaired remote memory. Judgment: Judgment is not impulsive or inappropriate. Neurologic Exam      NEUROLOGICALEXAMINATION :      A) MENTAL STATUS:                   Alert and  oriented  To time, place  And  Person. No Aphasia. No  Dysarthria. Able   To  Follow   SIMPLE    commands   without   Any  Difficulty. No right  To left confusion. Normal  Speech  And language function. Insight and  Judgment ,Fund  Of  Knowledge   within normal limits                Recent  And  Remote memory  DECREASED                Attention &  Concentration are    DECREASED                                                 B) CRANIAL NERVES :      CN : Visual  Acuity  And  Visual fields  within normal limits               Fundi  Could  Not  Be  Could  Not  Be  Evaluated. 3,4,6 CN : Both  Pupils are   Reactive and  Equal.  Movements  Are  Intact. No  Nystagmus. No  VASHTI. No  Afferent  Pupillary  Defect noted. 5 CN :  Normal  Facial sensations and Corneal  Reflexes           7 CN:  Normal  Facial  Symmetry  And  Strength. No facial  Weakness.            8 CN :  Hearing  Appears    DECREASED            9, 10 CN: Normal   spontaneous, reflex   palate   movements         11 CN:   Normal  Shoulder  shrug and  strength         12 CN :   Normal  Tongue movements and  Tongue  In midline                        No tongue   Fasciculations or atrophy       C) MOTOR  EXAM:                 Strength  In upper  5/5      And  Lower   extremities  3+/5   RIGHT     AND     4+/5   IN  LEFT                    Rapid   alternating  And  repetitions  Movements    DECREASED                   Muscle  Tone  In upper  And  Lower  Extremities  normal                No rigidity. No  Spasticity. Bradykinesia   absent               No  Asterixis. Sustention  Tremor , Resting   Tremor   absent                    No   other  Abnormal  Movements noted           D) SENSORY :               Light   touch, pinprick,   position  And  Vibration   IMPAIRED  IN                             GLOVE  AND  STOCKING  MANNER        E) REFLEXES:                   Deep  Tendon  Reflexes     DECREASED                    No  pathological  Reflexes  Bilaterally.                                   F) COORDINATION  AND  GAIT :                                Station and  Gait    SLOW                                Romberg 's test   POSITIVE                            Ataxia negative          ASSESSMENT:      Patient Active Problem List   Diagnosis    [facility] ([facility]    Hypertension    CHF (congestive heart failure) (HCC)    Atrial fibrillation (HCC)    GERD (gastroesophageal reflux disease)    Chronic lumbar radiculopathy    Class 2 obesity due to excess calories without serious comorbidity with body mass index (BMI) of 35.0 to 35.9 in adult    Weakness of right lower extremity    Numbness and tingling    Polyneuropathy associated with underlying disease ([address]    H/O falling    Balance problem    Chronic cerebral ischemia    Memory problem    Bilateral carotid artery stenosis    Elevated hemoglobin A1c    Type 2 diabetes mellitus with diabetic polyneuropathy, without long-term current use of insulin ([address]           MRI OF THE BRAIN WITHOUT CONTRAST  9/13/2019 10:50 am       TECHNIQUE:   Multiplanar multisequence MRI of the brain was performed without the administration of intravenous contrast.       COMPARISON:   None.       HISTORY:   ORDERING SYSTEM PROVIDED HISTORY: H/O falling   TECHNOLOGIST PROVIDED HISTORY:   Reason for Exam:  Loss of feeling in right leg for one to two years. Acuity: Chronic   Type of Exam: Unknown   Additional signs and symptoms: H/O falling; Balance problem; Chronic cerebral   ischemia; Memory problem.       FINDINGS:   INTRACRANIAL STRUCTURES/VENTRICLES: The sellar and suprasellar structures,   optic chiasm, corpus callosum, pineal gland, tectum, and midline brainstem   structures are unremarkable.  The craniocervical junction is unremarkable. There is no acute intracranial hemorrhage, mass effect, or midline shift. There is satisfactory overall gray-white matter differentiation.  The   ventricular structures are symmetric and unremarkable.  The infratentorial   structures including the cerebellopontine angles and internal auditory canals   are unremarkable. There is no abnormal restricted diffusion.  There is no   abnormal blooming artifact on susceptibility weighted imaging.       ORBITS: The visualized portion of the orbits demonstrate no acute abnormality.       SINUSES: The visualized paranasal sinuses and mastoid air cells are well   aerated.       BONES/SOFT TISSUES: The bone marrow signal intensity appears normal. The soft   tissues demonstrate no acute abnormality.           Impression   Unremarkable MRI of the brain.             ULTRASOUND EVALUATION OF THE CAROTID ARTERIES       9/13/2019       COMPARISON:   None       HISTORY:   ORDERING SYSTEM PROVIDED HISTORY: H/O falling   TECHNOLOGIST PROVIDED HISTORY:   Reason for Exam: memory problems   Acuity: Acute   Type of Exam: Initial   Relevant Medical/Surgical History: HTN, HLD and diabetes       FINDINGS:       RIGHT:       The right common carotid artery demonstrates peak systolic velocities of 66   and 55 cm/sec in the proximal and distal segments respectively.       The right internal carotid artery demonstrates the systolic velocities of 32,   51, and 58 cm/sec in the proximal, mid and distal segments respectively.       The external carotid artery is patent.  The vertebral artery demonstrates   normal antegrade flow.       Atherosclerotic disease is present at the bifurcation.       ICA/CCA ratio of 0.6.           LEFT:       The left common carotid artery demonstrates peak systolic velocities of 87,   and 68 cm/sec in the proximal and distal segments respectively.       The left internal carotid artery demonstrates the systolic velocities of 50,   97, and 78 cm/sec in the proximal, mid and distal segments respectively.       The external carotid artery is patent.  The vertebral artery demonstrates   normal antegrade flow.       Atherosclerotic disease is present at the bifurcation.       There is approximately 52% stenosis of left proximal internal carotid artery   secondary to irregular plaque.  This is according to diameter measurement.       ICA/CCA ratio of 0.6.           Impression   1. The left internal carotid artery demonstrates 50-69% stenosis by diameter   measurement.       2. The right internal carotid artery demonstrates 0-50% stenosis.       3. Bilateral vertebral arteries are patent with flow in the normal direction.       4. Atherosclerotic disease at the bilateral carotid bifurcation.       RECOMMENDATIONS:   Follow-up CTA neck. VISITING DIAGNOSIS:          ICD-10-CM    1. Memory problem  R41.3 VL DUP CAROTID BILATERAL     US TRANSCRANIAL DOPPLER COMPLETE   2. Chronic systolic congestive heart failure (HCC)  I50.22    3. Class 2 obesity due to excess calories without serious comorbidity with body mass index (BMI) of 35.0 to 35.9 in adult  E66.09     Z68.35    4. Essential hypertension  I10 VL DUP CAROTID BILATERAL     US TRANSCRANIAL DOPPLER COMPLETE   5.  Stenosis of left carotid artery  I65.22 VL DUP CAROTID BILATERAL     US TRANSCRANIAL DOPPLER COMPLETE   6. Weakness of right lower extremity  R29.898    7. Polyneuropathy associated with underlying disease ([facility] [place] [address]  G63    8. Elevated hemoglobin A1c  R73.09    9. Atrial fibrillation, unspecified type ([facility] [place] [address]  I48.91    10. Numbness and tingling  R20.0     R20.2    11. Balance problem  R26.89 VL DUP CAROTID BILATERAL     US TRANSCRANIAL DOPPLER COMPLETE   12. H/O falling  Z91.81 VL DUP CAROTID BILATERAL     US TRANSCRANIAL DOPPLER COMPLETE   13. Gastroesophageal reflux disease, unspecified whether esophagitis present  K21.9    14. Chronic cerebral ischemia  I67.82 VL DUP CAROTID BILATERAL     US TRANSCRANIAL DOPPLER COMPLETE   15. Chronic lumbar radiculopathy  M54.16    16. Type 2 diabetes mellitus with diabetic polyneuropathy, without long-term current use of insulin (HCC)  E11.42                   CONCERNS   &   INCREASED   RISK   FOR        * TIA,  CEREBRO  VASCULAR  ISCHEMIA,STROKE      *   COGNITIVE  &   MEMORY PROBLEMS  AND  DEMENTIAS       *  MONONEUROPATHIES,   RADICULOPATHY       *   PERIPHERAL  NEUROPATHY,   NEUROPATHIC  PAIN      *  GAIT  DIFFICULTIES  &   BALANCE PROBLMES   AND  FALL                VARIOUS  RISK   FACTORS   WERE  REVIEWED   AND   DISCUSSED. *  PATIENT   HAS  MULTIPLE   MEDICAL, NEUROLOGICAL      PROBLEMS . PATIENT'S   MANAGEMENT  IS  CHALLENGING. PLAN:                         Yanira Comment  Of  The  Diagnoses,  The  Management & Treatment  Options            AND    Care  plan  Were          Reviewed and   Discussed   With  patient. * Goals  And  Expectations  Of  The  Therapy  Discussed   And  Reviewed. *   Benefits   And   Side  Effect  Profile  Of  Medication/s   Were   Discussed                * Need   For  Further   Follow up For  The  Various  Problems Were  discussed.           * Results  Of  The  Previous  Diagnostic tests were reviewed and  discussed                 Medical  Decision  Making  Was  Made  Based on the   Complexity  Of  Above Mentioned  Diagnoses,    Data reviewed             And    Risk  Of  Significant   Co morbidities and   complicating   Factors. Medical  Decision  Was   High    Complexity   Due   To  The  Patient's  Multiple  Symptoms,      Advancing   Disease,  Complex  Treatment  Regimen,  Multiple medications           and   Risk  Of   Side  Effects,  Difficulty  In  Medication  Management  And  Diagnostic  Challenges       In  View  Of  The  Associated   Co  Morbid  Conditions   And  Problems. * FALL   PRECAUTIONS. THESE  REVIEWED   AND  DISCUSSED    *  USE   WALKING  ASSISTANCE  DEVICES     QUAD  CANE          *   ABSOLUTELY   NO  DRIVING      *   BE  CAREFUL  WITH  ACTIVITIES  . *   AVOID   NECK  AND/ BACK  STRAINING  ACTIVITIES      *   TO   WEAR   BILATERAL   WRIST  BRACES       *   TO  AVOID  PRESSURE  OVER   ULNAR  ASPECT   OF   ELBOWS        *   ADEQUATE   FLUIDINTAKE   AND  ELECTROLYTE  BALANCE             * KEEP  DAIRY  OF   THE  NEUROLOGICAL  SYMPTOMS        RECORDING THE    DURATION  AND  FREQUENCY. *  AVOID    CONDITIONS  AND  FACTORS   THAT  MAKE                  NEUROLOGICAL  SYMPTOMS  WORSE.                    *TO  MAINTAIN  REGULAR  SLEEP  WAKE  CYCLES. *   TO  HAVE  ADEQUATE  REST  AND   SLEEP    HOURS.          *    TO   AVOID   TO  SLEEP  IN   SUPINE  POSITION. *      WEIGHT   LOSS. *    AVOID  ANY USAGE OF    TOBACCO,              EXCESSIVE  ALCOHOL  AND   ILLEGAL   SUBSTANCES        *   Compliance   With  Medications   And  Instructions        * CURRENTLY    TOLERATING  THE  PRESCRIBED   MEDICATIONS. WITHOUT  ANY  SIGNIFICANT  SIDE  EFFECTS   &  GETTING BENEFIT.           *    Prophylactic  Use   Of     Vitamin   B   Complex,  Folic  Acid,    Vitamin  B12    Multivitamin,       Calcium  With  magnesium  And  Vit D    Supplementations   Over  The  Counter  Discussed         *FOOT  CARE, DAILY  INSPECTION  OF  FEET   AND PERIODIC  PODIATRY EVALUATIONS . *  PATIENT  IS  ALSO   COUNSELED   TO  KEEP    ACTIVITIES:         A)   SIMPLE      B)  ORGANIZED      C)  WRITEDOWN                       *  EVALUATIONS  AND  FOLLOW UP:                 * PHYSICAL  THERAPY                                 *CARDIOLOGY              *   OPTHALMOLOGY                                                   *       H/O    CHRONIC      MEMORY  PROBLEMS                                            FOR     2  -3    YEARS                                      -  STABLE                                 NO  CORRECTABLE  ETIOLOGIES                           *         PATIENT  NOT  INTERESTED    IN    MEDICATION  FOR                                         HIS   COGNITIVE  PROBLEMS.                                                 *       H/O   CHRONIC       ATRIAL  FIBRILLATION                                         -   ON    ELIQUIS                                  BEING  FOLLOWED  BY   HIS   CARDIOLOGY                                                    *            HAD    NEURO  DIAGNOSTIC  EVALUATIONS  IN  SEPT. 2019                                     A) MRI  BRAIN     SHOWED  NO  ACUTE  PATHOLOGY                                      B)  CAROTID  DOPPLER     SHOWED     50 - 69  %    LEFT   STENOSIS                                                          -   NEEDS  MONITORING                  Orders Placed This Encounter   Procedures    VL DUP CAROTID BILATERAL    US TRANSCRANIAL DOPPLER COMPLETE                   *PATIENT   TO  FOLLOW  UP  WITH   PRIMARY  CARE         OTHER  CONSULTANTS  AS  BEFORE. *  Maintain   Healthy  Life Style    With   Periodic  Monitoring  Of      Any  Medical  Conditions  Including   Elevated  Blood  Pressure,  Lipid  Profile,     Blood  Sugar levels  AndHeart  Disease.                 *   Period   Screening  For  Cancers  Involving  Breast,  Colon,    lungs  And  Other  Organs  As  Applicable,  In consultation With  Your  Primary Care Providers. *Second  Neurological  Opinion  And  Evaluations  In  [facility] AND [facility]  Setting  If  Patient  Is  Interested. * Please   Contact   Neurology  Clinic   Early   If   Are  Any  New  Neurological   And  Any neurological  Concerns. *  If  The  Patient remains  Neurologically  Stable   Return   To  [facility] Neurology Department   IN   6         MONTHS  TIME   FOR  FURTHER              FOLLOW UP.                           *   The  Neurological   Findings,  Possible  Diagnosis,  Differential diagnoses                    And  Options  For    Further   Investigations                   And  management   Are  Discussed  Comprehensively. Medications   And  Prescription   Risks  And  Side effects  Are   Also  Discussed. *  If   There is  Any  Significant  Worsening   Of  Current  Symptoms  And  Or                  If patient  Develops   Any additional  New  NeurologicalSymptoms                  Or  Significant  Concerns   Should  Call  911 or  Go  To  Emergency  Department  For  Further  Immediate  Evaluation. The   Above  Were  Reviewed  With  patient   and                       questions  Answered  In  Detail. More   Than  50% of face  To face Time   Was  Spent  On  Counseling                    And   Coordination  Of  Care   Of   Patient's  multiple   Neurological  Problems                         And   Comorbid  Medical   Conditions. Electronically signed by Hayder Ortiz MD    Board Certified in  Neurology &  In  [address] of Psychiatry and Neurology ([employer])      DISCLAIMER:   Although every effort was made to ensure the accuracy of this  electronictranscription, some errors in transcription may have occurred.       GENERAL PATIENT INSTRUCTIONS:      A Healthy Lifestyle: Care Instructions   Your Care Instructions   A healthy lifestyle can help you feel good, stay at ahealthy weight, and have plenty of energy for both work and play. A healthy lifestyle is something you can share with your whole family.  A healthy lifestyle also can lower your risk for serious health problems, such ashigh blood pressure, heart disease, and diabetes.  You can follow a few steps listed below to improve your health and the health of your family.  Follow-up careis a key part of your treatment and safety. Be sure to make and go to all appointments, and call your doctor if you are having problems. Its also a good idea to know your test results and keep a list of the medicines you take.  How can you care for yourself at home?  Do not eat too much sugar, fat, or fast foods. You can still have dessert and treats nowand then. The goal is moderation.  Start small to improve your eating habits. Pay attention to portion sizes, drink less juice and soda pop, and eat more fruits and vegetables.  Eat a healthy amount of food. A 3-ounce serving of meat, for example, is about the size of a deck of cards. Fill the rest of your plate with vegetables and whole grains.  Limit theamount of soda and sports drinks you have every day. Drink more water when you are thirsty.  Eat at least 5 servings of fruits and vegetables every day. It may seem like a lot, but it is not hard to reach this goal. Aserving or helping is 1 piece of fruit, 1 cup of vegetables, or 2 cups of leafy, raw vegetables. Have an apple or some carrot sticks as an afternoon snack instead of a candy bar. Try to have fruits and/or vegetables at everymeal.   Make exercise part of your daily routine. You may want to start with simple activities, such as walking, bicycling, or slow swimming. Try aislinn active 30 to 60 minutes every day. You do not need to do all 30 to 60 minutes all at once.  For example, you can exercise 3 times a day for 10 or 20 minutes. Moderate exercise is safe for most people, but it is always agood idea to talk to your doctor before starting an exercise program.   Keep moving. Vinh Bansaler the lawn, work in the garden, or [employer]. Take the stairs instead of the elevator at work.  If you smoke, quit. Peoplewho smoke have an increased risk for heart attack, stroke, cancer, and other lung illnesses. Quitting is hard, but there are ways to boost your chance of quitting tobacco for good.  Use nicotine gum, patches, or lozenges.  Ask your doctor about stop-smoking programs and medicines.  Keep trying.  In addition to reducing your risk of diseases in the future, you will notice some benefits soon after you stop using tobacco. If you have shortness of breath or asthma symptoms, they will likely getbetter within a few weeks after you quit.  Limit how much alcohol you drink. Moderate amounts of alcohol (up to 2 drinks a day for men, 1drink a day for women) are okay. But drinking too much can lead to liver problems, high blood pressure, and other health problems.  health   If you have a family, there are many things you can do together to improve your health.  Eat meals together as a family as often as possible.  Eat healthy foods. This includes fruits, vegetables, lean meats and dairy, and whole grains.  Include your family in your fitness plan. Most peoplethink of activities such as jogging or tennis as the way to fitness, but there are many ways you and your family can be more active. Anything that makes you breathe hard and gets your heart pumping is exercise. Here are sometips:   Walk to do errands or to take your child to school or the bus.  Go for a family bike ride after dinner instead of watching TV.  Where can you learn more?  Go toboo-boxtps://savanaheb.[email or web address]. org and sign in to your [employer] account.  Enter H663 in the Search HealthInformation box to learn more about \"A Healthy Lifestyle: Care Instructions. \"     If you do not have anaccount, please click on the \"Sign Up Now\" link.  Current as of: July 26, 2016   Content Version: 11.2   © 1635-4657 [employer]. Care instructions adapted under license by [facility] ([facility]). If you have questions about a medical condition or this instruction, always ask your healthcare professional. [employer] disclaims any warranty or liability for your use of this information.

## 2020-11-05 ENCOUNTER — HOSPITAL ENCOUNTER (OUTPATIENT)
Dept: INTERVENTIONAL RADIOLOGY/VASCULAR | Age: 75
Discharge: HOME | End: 2020-11-07
Payer: MEDICARE

## 2020-11-05 ENCOUNTER — HOSPITAL ENCOUNTER (OUTPATIENT)
Dept: NEUROLOGY | Age: 75
Discharge: HOME | End: 2020-11-05
Payer: MEDICARE

## 2020-11-05 PROCEDURE — 93886 INTRACRANIAL COMPLETE STUDY: CPT

## 2020-11-05 PROCEDURE — 93892 TCD EMBOLI DETECT W/O INJ: CPT

## 2020-11-05 PROCEDURE — 93880 EXTRACRANIAL BILAT STUDY: CPT

## 2020-11-05 NOTE — PROGRESS NOTES
TCD Completed with Emboli Detection. PCP: Remberto Godwin MD    Ordering: Vlad Mishra. Abhijeet Neurologist    Interpreting Physician: Vlad Mishra.  Manjit Jha    Electronically signed by Eliezer Cortez RCP on 11/5/2020 at 1:32 PM

## 2020-11-06 NOTE — PROCEDURES
and analysis of the waveforms and continuous soundtrack systems  during the current monitoring show no evidence of HITS (high intensity  transient signals) and no embolic shower events were detected. IMPRESSION:      1. 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 2.  The flow velocities in the both posterior cerebral arteries and left        anterior cerebral artery could not be evaluated due to the absence of         the corresponding temporal windows. 3.  Severe vertebrobasilar stenosis. 4.  TCD of intracranial arteries for emboli detection is negative. Further clinical correlation and followup recommended. Peg Harper MD, [facility]     Board Certified in Neurology  & in  [place] [id number] of Psychiatry and Neurology ([employer]PN).           D: 11/05/2020 17:57:10       T: 11/05/2020 18:55:28     [email or web address]  Job#: [id number]     Doc#: [id number]    CC:  Alcides Menjivar

## 2021-05-14 ENCOUNTER — OFFICE VISIT (OUTPATIENT)
Dept: NEUROLOGY | Age: 76
End: 2021-05-14
Payer: MEDICARE

## 2021-05-14 VITALS
HEIGHT: 67 IN | SYSTOLIC BLOOD PRESSURE: 128 MMHG | OXYGEN SATURATION: 98 % | HEART RATE: 60 BPM | WEIGHT: 282 LBS | BODY MASS INDEX: 44.26 KG/M2 | DIASTOLIC BLOOD PRESSURE: 72 MMHG

## 2021-05-14 DIAGNOSIS — I65.23 BILATERAL CAROTID ARTERY STENOSIS: ICD-10-CM

## 2021-05-14 DIAGNOSIS — R26.89 BALANCE PROBLEM: ICD-10-CM

## 2021-05-14 DIAGNOSIS — E66.09 CLASS 2 OBESITY DUE TO EXCESS CALORIES WITHOUT SERIOUS COMORBIDITY WITH BODY MASS INDEX (BMI) OF 35.0 TO 35.9 IN ADULT: ICD-10-CM

## 2021-05-14 DIAGNOSIS — R20.0 NUMBNESS AND TINGLING: ICD-10-CM

## 2021-05-14 DIAGNOSIS — R41.3 MEMORY PROBLEM: Primary | ICD-10-CM

## 2021-05-14 DIAGNOSIS — I10 ESSENTIAL HYPERTENSION: ICD-10-CM

## 2021-05-14 DIAGNOSIS — I50.22 CHRONIC SYSTOLIC CONGESTIVE HEART FAILURE (HCC): ICD-10-CM

## 2021-05-14 DIAGNOSIS — R73.09 ELEVATED HEMOGLOBIN A1C: ICD-10-CM

## 2021-05-14 DIAGNOSIS — R20.2 NUMBNESS AND TINGLING: ICD-10-CM

## 2021-05-14 DIAGNOSIS — I48.91 ATRIAL FIBRILLATION, UNSPECIFIED TYPE (HCC): ICD-10-CM

## 2021-05-14 DIAGNOSIS — I67.82 CHRONIC CEREBRAL ISCHEMIA: ICD-10-CM

## 2021-05-14 DIAGNOSIS — G63 POLYNEUROPATHY ASSOCIATED WITH UNDERLYING DISEASE (HCC): ICD-10-CM

## 2021-05-14 DIAGNOSIS — M54.16 CHRONIC LUMBAR RADICULOPATHY: ICD-10-CM

## 2021-05-14 DIAGNOSIS — Z91.81 H/O FALLING: ICD-10-CM

## 2021-05-14 DIAGNOSIS — E11.42 TYPE 2 DIABETES MELLITUS WITH DIABETIC POLYNEUROPATHY, WITHOUT LONG-TERM CURRENT USE OF INSULIN (HCC): ICD-10-CM

## 2021-05-14 PROCEDURE — 99214 OFFICE O/P EST MOD 30 MIN: CPT | Performed by: PSYCHIATRY & NEUROLOGY

## 2021-05-14 ASSESSMENT — ENCOUNTER SYMPTOMS
ABDOMINAL DISTENTION: 0
EYE PAIN: 0
SORE THROAT: 0
EYE DISCHARGE: 0
WHEEZING: 0
VISUAL CHANGE: 0
CHEST TIGHTNESS: 0
VOMITING: 0
EYE REDNESS: 0
APNEA: 0
COUGH: 0
SHORTNESS OF BREATH: 0
BACK PAIN: 1
CHOKING: 0
COLOR CHANGE: 0
EYE ITCHING: 0
TROUBLE SWALLOWING: 0
VOICE CHANGE: 0
BOWEL INCONTINENCE: 0
NAUSEA: 0
FACIAL SWELLING: 0
CONSTIPATION: 0
PHOTOPHOBIA: 0
SINUS PRESSURE: 0
BLOOD IN STOOL: 0
DIARRHEA: 0
ABDOMINAL PAIN: 0

## 2021-05-14 NOTE — PROGRESS NOTES
[facility]  Neurology  [address] [place] [address]  JQBRA:[phone]   Fax: [phone]      SUBJECTIVE:       PATIENT ID:  Dolores Hernandez is a  RIGHT     HANDED 78 y.o. male. Back Pain  This is a chronic problem. The current episode started more than 1 year ago. The problem occurs intermittently. The problem has been gradually worsening since onset. The pain is present in the lumbar spine. The quality of the pain is described as aching and cramping. The pain does not radiate. The pain is at a severity of 3/10. The pain is mild. The pain is worse during the day. The symptoms are aggravated by standing and sitting. Stiffness is present all day. Associated symptoms include numbness, paresthesias, tingling and weakness. Pertinent negatives include no abdominal pain, bladder incontinence, bowel incontinence, chest pain, dysuria, fever, headaches, leg pain, paresis, pelvic pain, perianal numbness or weight loss. Risk factors include obesity, lack of exercise and sedentary lifestyle. He has tried home exercises and bed rest for the symptoms. The treatment provided mild relief. Neurologic Problem  The patient's primary symptoms include clumsiness, focal sensory loss, focal weakness, a loss of balance, memory loss and weakness. The patient's pertinent negatives include no altered mental status, near-syncope, slurred speech, syncope or visual change. This is a chronic problem. Episode onset: MORE    THAN   2  YEARS. The neurological problem developed gradually. The problem has been waxing and waning since onset. There was lower extremity, left-sided and right-sided focality noted. Associated symptoms include back pain and vertigo.  Pertinent negatives include no abdominal pain, auditory change, aura, bladder incontinence, bowel incontinence, chest pain, confusion, diaphoresis, dizziness, fatigue, fever, headaches, light-headedness, nausea, neck pain, palpitations, shortness of breath or vomiting. Past treatments include medication. The treatment provided no relief. His past medical history is significant for dementia. There is no history of a bleeding disorder, a clotting disorder, a CVA, head trauma, liver disease, mood changes or seizures. History obtained from  The patient     and other  available   medical  records   were  Also  reviewed. The  Duration,  Quality,  Severity,  Location,  Timing,  Context,  Modifying  Factors   Of   The   Chief   Complaint   And  Present  Illness       Was   Reviewed   In   Chronological   Manner.                                             PATIENT'S  MAIN  CONCERNS INCLUDE :                       1)   H/O   CHRONIC  LUMBAR   PAIN   AND  RADICULOPATHY                                             MORE  SO     RIGHT  SIDE                                                   -     STABLE                          2)     H/O    WEAKNESS    AND  NUMBNESS   MORE  IN  THE                                         RIGHT  LOWER  EXTREMITY                                                         -     STABLE                            3)      TYPE  II  DM    SINCE     2005                                        WITH  ELEVATED    HEMOGLOBIN   A 1 C                             4)           PERIPHERAL POLYNEUROPATHY                                            MORE  SO  BOTH  LOWER  EXTREMITIES                                                      -    STABLE                                 H/O    EXPOSURE  TO  AGENT  ORANGE                            5)    H/O    MILD   BALANCE  PROBLEMS                                                  -  BETTER                           6)     H/O    RECURRENT  FALLS                                          FOR   MORE   THAN    2   YEARS                                            NO   RECENT   FALLS                           7)    H/O    CHRONIC      MEMORY  PROBLEMS                                            FOR     2 - 3 14)     VARIOUS  RISK   FACTORS   WERE  REVIEWED   AND   DISCUSSED. PATIENT   HAS  MULTIPLE   MEDICAL, NEUROLOGICAL      PROBLEMS . PATIENT'S   MANAGEMENT  IS  CHALLENGING.                                         PRECIPITATING  FACTORS: including  fever/infection, exertion/relaxation, position change, stress, weather change,                        medications/alcohol, time of day/darkness/light  Are  absent                                                              MODIFYING  FACTORS:  fever/infection, exertion/relaxation, position change, stress, weather change,                        medications/alcohol, time of day/darkness/light  Are  absent             Patient   Indicates   The  Presence   And  The  Absence  Of  The  Following    Associated  And   Additional  Neurological    Symptoms:                                Balance  And coordination   problems  present           Gait problems     present            Headaches      absent              Migraines           absent           Memory problemspresent              Confusion        absent            Paresthesia   numbness          present           Seizures  And  Starring  Episodes           absent           Syncope,  Near  syncopal episodes         absent           Speech   problems           absent             Swallowing   Problems      absent            Dizziness,  Light headedness           absent              Vertigo        absent             Generalized   Weakness    absent              focal  Weakness     present             Tremors         absent              Sleep  Problems     absent             History  Of   Recent  Head  Injury     absent             History  Of   Recent  TIA     absent             History  Of   Recent    Stroke     absent             Neck  Pain   and   Neck muscle  Spasms  absent               Radiating  down   And   Weakness           absent Lower back   Pain  And     Spasms  present              Radiating    Down   And   Weakness          present                H/OFALLS        present               History  Of   Visual  Symptoms    absent                  Associated   Diplopia       absent                                               Also   Additional   Symptoms   Present    As  Documented    In   The   detailed                  Review  Of  Systems   And    Please   Refer   To    Them for   Additional    Information. Any components  That are either  Unobtainable  Or  Limited  In   HPI, ROS  And/or PFSH   Are                   Due   ToPatient's  Medical  Problems,  Clinical  Condition   and/or lack of                                other    Alternate   resources.           RECORDS   REVIEWED:    historical medical records       INFORMATION   REVIEWED:     MEDICAL   HISTORY,SURGICAL   HISTORY,     MEDICATIONS   LIST,   ALLERGIES AND  DRUG  INTOLERANCES,       FAMILY   HISTORY,  SOCIAL  HISTORY,      PROBLEM  LIST   FOR  PATIENT  CARE   COORDINATION      Past Medical History:   Diagnosis Date    [facility]     Cancer of kidney ([facility]     Stage 3    [facility]    GERD (gastroesophageal reflux disease)     Hypertension     Type 2 diabetes mellitus ([facility]          Past Surgical History:   Procedure Laterality Date    APPENDECTOMY      CIRCUMCISION  2014    FOOT FRACTURE SURGERY      Right foot, Broken ankle    TONSILLECTOMY           Current Outpatient Medications   Medication Sig Dispense Refill    levothyroxine (SYNTHROID) 50 MCG tablet Take 50 mcg by mouth every morning (before breakfast)      amiodarone (CORDARONE) 200 MG tablet Take 200 mg by mouth 2 times daily      triamcinolone (ARISTOCORT) 0.5 % cream apply to affected area twice a day      torsemide (DEMADEX) 20 MG tablet Take 40 mg by mouth daily      Cholecalciferol (VITAMIN D3) 1.25 MG (16141 UT) CAPS Take by mouth  Multiple Vitamins-Minerals (MULTIVITAMIN ADULT PO) Take by mouth      atorvastatin (LIPITOR) 10 MG tablet Take 10 mg by mouth daily Half tablet daily      apixaban (ELIQUIS) 5 MG TABS tablet Take 5 mg by mouth 2 times daily      metoprolol tartrate (LOPRESSOR) 50 MG tablet Take 50 mg by mouth 2 times daily      blood glucose test strips (GMATE BLOOD GLUCOSE TEST) strip 1 each by In Vitro route daily As needed.  Lancets MISC 1 each by Does not apply route daily      Insulin Pen Needle (PEN NEEDLES) 31G X 6 MM MISC 1 each by Does not apply route daily      Insulin Pen Needle (B-D UF III MINI PEN NEEDLES) 31G X 5 MM MISC 1 each by Does not apply route daily      glimepiride (AMARYL) 2 MG tablet Take 2 mg by mouth every morning (before breakfast) Take half tablet to 1 tablet every morning. Hold if blood sugar going low.  Liraglutide (VICTOZA SC) Inject 1.8 mg into the skin daily      dronedarone hcl (MULTAQ) 400 MG TABS Take 400 mg by mouth 2 times daily (with meals)      furosemide (LASIX) 20 MG tablet Take 20 mg by mouth daily      ROSIGLITAZONE-GLIMEPIRIDE PO Take 45 mg by mouth daily       No current facility-administered medications for this visit.           No Known Allergies      Family History   Problem Relation Age of Onset    Stroke Mother     Heart Disease Father     Heart Attack Father          Social History     Socioeconomic History    Marital status:      Spouse name: Not on file    Number of children: Not on file    Years of education: Not on file    Highest education level: Not on file   Occupational History    Not on file   Social Needs    Financial resource strain: Not on file    Food insecurity     Worry: Not on file     Inability: Not on file    Transportation needs     Medical: Not on file     Non-medical: Not on file   Tobacco Use    Smoking status: Former Smoker     Years: 10.00    Smokeless tobacco: Never Used    Tobacco comment: Age 14 to 31's Negative for cold intolerance, heat intolerance, polydipsia, polyphagia and polyuria. Genitourinary: Negative for bladder incontinence, dysuria and pelvic pain. Musculoskeletal: Positive for back pain and gait problem. Negative for arthralgias, joint swelling, myalgias, neck pain and neck stiffness. Skin: Negative for color change, pallor, rash and wound. Allergic/Immunologic: Negative for environmental allergies, food allergies and immunocompromised state. Neurological: Positive for vertigo, tingling, focal weakness, weakness, numbness, paresthesias and loss of balance. Negative for dizziness, tremors, seizures, syncope, facial asymmetry, speech difficulty, light-headedness and headaches. Hematological: Negative for adenopathy. Does not bruise/bleed easily. Psychiatric/Behavioral: Positive for decreased concentration and memory loss. Negative for agitation, behavioral problems, confusion, dysphoric mood, hallucinations, self-injury, sleep disturbance and suicidal ideas. The patient is not nervous/anxious and is not hyperactive. OBJECTIVE:    Physical Exam  Constitutional:       Appearance: He is well-developed. HENT:      Head: Normocephalic and atraumatic. No raccoon eyes or Ritter's sign. Right Ear: External ear normal.      Left Ear: External ear normal.      Nose: Nose normal.   Eyes:      Conjunctiva/sclera: Conjunctivae normal.   Neck:      Musculoskeletal: Normal range of motion and neck supple. Normal range of motion. No neck rigidity or muscular tenderness. Thyroid: No thyroid mass or thyromegaly. Vascular: No carotid bruit. Trachea: No tracheal deviation. Meningeal: Brudzinski's sign and Kernig's sign absent. Cardiovascular:      Rate and Rhythm: Normal rate and regular rhythm. Pulmonary:      Effort: Pulmonary effort is normal.   Musculoskeletal:         General: No tenderness. Skin:     General: Skin is warm. Coloration: Skin is not pale. Findings: No erythema or rash. Nails: There is no clubbing. Psychiatric:         Attention and Perception: He is attentive. Mood and Affect: Mood is not anxious or depressed. Affect is not labile, blunt, angry or inappropriate. Behavior: Behavior is not agitated, slowed, aggressive, withdrawn, hyperactive or combative. Behavior is cooperative. Thought Content: Thought content is not paranoid or delusional. Thought content does not include homicidal or suicidal ideation. Thought content does not include homicidal or suicidal plan. Cognition and Memory: Memory is impaired. He does not exhibit impaired recent memory or impaired remote memory. Judgment: Judgment is not impulsive or inappropriate. Neurologic Exam      NEUROLOGICALEXAMINATION :      A) MENTAL STATUS:                   Alert and  oriented  To time, place  And  Person. No Aphasia. No  Dysarthria. Able   To  Follow   SIMPLE    commands   without   Any  Difficulty. No right  To left confusion. Normal  Speech  And language function. Insight and  Judgment ,Fund  Of  Knowledge   within normal limits                Recent  And  Remote memory  DECREASED                Attention &  Concentration are    DECREASED                                                 B) CRANIAL NERVES :      CN : Visual  Acuity  And  Visual fields  within normal limits               Fundi  Could  Not  Be  Could  Not  Be  Evaluated. 3,4,6 CN : Both  Pupils are   Reactive and  Equal.  Movements  Are  Intact. No  Nystagmus. No  VASHTI. No  Afferent  Pupillary  Defect noted. 5 CN :  Normal  Facial sensations and Corneal  Reflexes           7 CN:  Normal  Facial  Symmetry  And  Strength. No facial  Weakness.            8 CN :  Hearing  Appears    DECREASED            9, 10 CN: Normal   spontaneous, reflex palate   movements         11 CN:   Normal  Shoulder  shrug and  strength         12 CN :   Normal  Tongue movements and  Tongue  In midline                        No tongue   Fasciculations or atrophy       C) MOTOR  EXAM:                 Strength  In upper  5/5      And  Lower   extremities  3+/5   RIGHT     AND     4+/5   IN  LEFT                    Rapid   alternating  And  repetitions  Movements    DECREASED                   Muscle  Tone  In upper  And  Lower  Extremities  normal                No rigidity. No  Spasticity. Bradykinesia   absent               No  Asterixis. Sustention  Tremor , Resting   Tremor   absent                    No   other  Abnormal  Movements noted           D) SENSORY :               Light   touch, pinprick,   position  And  Vibration   IMPAIRED  IN                             GLOVE  AND  STOCKING  MANNER        E) REFLEXES:                   Deep  Tendon  Reflexes     DECREASED                    No  pathological  Reflexes  Bilaterally.                                   F) COORDINATION  AND  GAIT :                                Station and  Gait    SLOW                                Romberg 's test   POSITIVE                            Ataxia negative          ASSESSMENT:      Patient Active Problem List   Diagnosis    [facility]    Hypertension    CHF (congestive heart failure) (HCC)    Atrial fibrillation (HCC)    GERD (gastroesophageal reflux disease)    Chronic lumbar radiculopathy    Class 2 obesity due to excess calories without serious comorbidity with body mass index (BMI) of 35.0 to 35.9 in adult    Weakness of right lower extremity    Numbness and tingling    Polyneuropathy associated with underlying disease ([address]    H/O falling    Balance problem    Chronic cerebral ischemia    Memory problem    Bilateral carotid artery stenosis    Elevated hemoglobin A1c    Type 2 diabetes mellitus with diabetic History: HTN, HLD and diabetes       FINDINGS:       RIGHT:       The right common carotid artery demonstrates peak systolic velocities of 66   and 55 cm/sec in the proximal and distal segments respectively.       The right internal carotid artery demonstrates the systolic velocities of 32,   51, and 58 cm/sec in the proximal, mid and distal segments respectively.       The external carotid artery is patent.  The vertebral artery demonstrates   normal antegrade flow.       Atherosclerotic disease is present at the bifurcation.       ICA/CCA ratio of 0.6.           LEFT:       The left common carotid artery demonstrates peak systolic velocities of 87,   and 68 cm/sec in the proximal and distal segments respectively.       The left internal carotid artery demonstrates the systolic velocities of 50,   97, and 78 cm/sec in the proximal, mid and distal segments respectively.       The external carotid artery is patent.  The vertebral artery demonstrates   normal antegrade flow.       Atherosclerotic disease is present at the bifurcation.       There is approximately 52% stenosis of left proximal internal carotid artery   secondary to irregular plaque.  This is according to diameter measurement.       ICA/CCA ratio of 0.6.           Impression   1. The left internal carotid artery demonstrates 50-69% stenosis by diameter   measurement.       2. The right internal carotid artery demonstrates 0-50% stenosis.       3. Bilateral vertebral arteries are patent with flow in the normal direction.       4. Atherosclerotic disease at the bilateral carotid bifurcation.       RECOMMENDATIONS:   Follow-up CTA neck. VISITING DIAGNOSIS:          ICD-10-CM    1. Memory problem  R41.3    2. Type 2 diabetes mellitus with diabetic polyneuropathy, without long-term current use of insulin ([facility])  E11.42    3. Chronic cerebral ischemia  I67.82    4. Essential hypertension  I10    5. Numbness and tingling  R20.0     R20.2    6.  Chronic lumbar radiculopathy  M54.16    7. Bilateral carotid artery stenosis  I65.23    8. Atrial fibrillation, unspecified type ([facility]  I48.91    9. Elevated hemoglobin A1c  R73.09    10. Balance problem  R26.89    11. Polyneuropathy associated with underlying disease ([facility]  G63    12. Class 2 obesity due to excess calories without serious comorbidity with body mass index (BMI) of 35.0 to 35.9 in adult  E66.09     Z68.35    13. Chronic systolic congestive heart failure (HCC)  I50.22    14. H/O falling  Z91.81                   CONCERNS   &   INCREASED   RISK   FOR        * TIA,  CEREBRO  VASCULAR  ISCHEMIA,STROKE      *   COGNITIVE  &   MEMORY PROBLEMS  AND  DEMENTIAS       *  MONONEUROPATHIES,   RADICULOPATHY       *   PERIPHERAL  NEUROPATHY,   NEUROPATHIC  PAIN      *  GAIT  DIFFICULTIES  &   BALANCE PROBLMES   AND  FALL                VARIOUS  RISK   FACTORS   WERE  REVIEWED   AND   DISCUSSED. *  PATIENT   HAS  MULTIPLE   MEDICAL, NEUROLOGICAL      PROBLEMS . PATIENT'S   MANAGEMENT  IS  CHALLENGING. PLAN:                         Tennille Mule  Of  The  Diagnoses,  The  Management & Treatment  Options            AND    Care  plan  Were          Reviewed and   Discussed   With  patient. * Goals  And  Expectations  Of  The  Therapy  Discussed   And  Reviewed. *   Benefits   And   Side  Effect  Profile  Of  Medication/s   Were   Discussed                * Need   For  Further   Follow up For  The  Various  Problems Were  discussed. * Results  Of  The  Previous  Diagnostic tests were reviewed and  discussed                 Medical  Decision  Making  Was  Made  Based on the   Complexity  Of  Above  Mentioned  Diagnoses,    Data reviewed             And    Risk  Of  Significant   Co morbidities and   complicating   Factors.                  Medical  Decision  Was   High    Complexity   Due   To  The  Patient's  Multiple  Symptoms,      Advancing   Disease,  Complex  Treatment Regimen,  Multiple medications           and   Risk  Of   Side  Effects,  Difficulty  In  Medication  Management  And  Diagnostic  Challenges       In  View  Of  The  Associated   Co  Morbid  Conditions   And  Problems. * FALL   PRECAUTIONS. THESE  REVIEWED   AND  DISCUSSED    *  USE   WALKING  ASSISTANCE  DEVICES     QUAD  CANE          *   ABSOLUTELY   NO  DRIVING      *   BE  CAREFUL  WITH  ACTIVITIES  . *   AVOID   NECK  AND/ BACK  STRAINING  ACTIVITIES      *   TO   WEAR   BILATERAL   WRIST  BRACES       *   TO  AVOID  PRESSURE  OVER   ULNAR  ASPECT   OF   ELBOWS        *   ADEQUATE   FLUIDINTAKE   AND  ELECTROLYTE  BALANCE             * KEEP  DAIRY  OF   THE  NEUROLOGICAL  SYMPTOMS        RECORDING THE    DURATION  AND  FREQUENCY. *  AVOID    CONDITIONS  AND  FACTORS   THAT  MAKE                  NEUROLOGICAL  SYMPTOMS  WORSE.                    *TO  MAINTAIN  REGULAR  SLEEP  WAKE  CYCLES. *   TO  HAVE  ADEQUATE  REST  AND   SLEEP    HOURS.          *    TO   AVOID   TO  SLEEP  IN   SUPINE  POSITION. *      WEIGHT   LOSS. *    AVOID  ANY USAGE OF    TOBACCO,              EXCESSIVE  ALCOHOL  AND   ILLEGAL   SUBSTANCES        *   Compliance   With  Medications   And  Instructions        * CURRENTLY    TOLERATING  THE  PRESCRIBED   MEDICATIONS. WITHOUT  ANY  SIGNIFICANT  SIDE  EFFECTS   &  GETTING BENEFIT. *    Prophylactic  Use   Of     Vitamin   B   Complex,  Folic  Acid,    Vitamin  B12    Multivitamin,       Calcium  With  magnesium  And  Vit D    Supplementations   Over  The  Counter  Discussed         *FOOT  CARE, DAILY  INSPECTION  OF  FEET   AND         PERIODIC  PODIATRY EVALUATIONS .             *  PATIENT  IS  ALSO   COUNSELED   TO  KEEP    ACTIVITIES:         A)   SIMPLE      B)  ORGANIZED      C)  WRITEDOWN                       *  EVALUATIONS  AND  FOLLOW UP:                 * PHYSICAL  THERAPY *CARDIOLOGY              *   OPTHALMOLOGY                                                   *       H/O    CHRONIC      MEMORY  PROBLEMS                                            FOR     2  -3    YEARS                                      -  STABLE                                                *         PATIENT  NOT  INTERESTED    IN    MEDICATION  FOR                                         HIS   COGNITIVE  PROBLEMS.                                                 *       H/O   CHRONIC       ATRIAL  FIBRILLATION                                         -   ON    ELIQUIS                                  BEING  FOLLOWED  BY   HIS   CARDIOLOGY                                                    *            HAD    NEURO  DIAGNOSTIC  EVALUATIONS  IN  SEPT. 2019                                     A) MRI  BRAIN     SHOWED  NO  ACUTE  PATHOLOGY                                      B)  CAROTID  DOPPLER     SHOWED     50 - 69  %    LEFT   STENOSIS                                                          -   NEEDS  MONITORING                          *PATIENT   TO  FOLLOW  UP  WITH   PRIMARY  CARE         OTHER  CONSULTANTS  AS  BEFORE. *  Maintain   Healthy  Life Style    With   Periodic  Monitoring  Of      Any  Medical  Conditions  Including   Elevated  Blood  Pressure,  Lipid  Profile,     Blood  Sugar levels  AndHeart  Disease. *   Period   Screening  For  Cancers  Involving  Breast,  Colon,    lungs  And  Other  Organs  As  Applicable,  In consultation   With  Your  Primary Care Providers. *Second  Neurological  Opinion  And  Evaluations  In  [facility]  Setting  If  Patient  Is  Interested. * Please   Contact   Neurology  Clinic   Early   If   Are  Any  New  Neurological   And  Any neurological  Concerns.                   *  If  The  Patient remains  Neurologically  Stable   Return   To  [facility] Neurology Department   IN   6 your family.  Follow-up careis a key part of your treatment and safety. Be sure to make and go to all appointments, and call your doctor if you are having problems. Its also a good idea to know your test results and keep a list of the medicines you take.  How can you care for yourself at home?  Do not eat too much sugar, fat, or fast foods. You can still have dessert and treats nowand then. The goal is moderation.  Start small to improve your eating habits. Pay attention to portion sizes, drink less juice and soda pop, and eat more fruits and vegetables.  Eat a healthy amount of food. A 3-ounce serving of meat, for example, is about the size of a deck of cards. Fill the rest of your plate with vegetables and whole grains.  Limit theamount of soda and sports drinks you have every day. Drink more water when you are thirsty.  Eat at least 5 servings of fruits and vegetables every day. It may seem like a lot, but it is not hard to reach this goal. Aserving or helping is 1 piece of fruit, 1 cup of vegetables, or 2 cups of leafy, raw vegetables. Have an apple or some carrot sticks as an afternoon snack instead of a candy bar. Try to have fruits and/or vegetables at everymeal.   Make exercise part of your daily routine. You may want to start with simple activities, such as walking, bicycling, or slow swimming. Try aislinn active 30 to 60 minutes every day. You do not need to do all 30 to 60 minutes all at once. For example, you can exercise 3 times a day for 10 or 20 minutes. Moderate exercise is safe for most people, but it is always agood idea to talk to your doctor before starting an exercise program.   Keep moving. [place] the lawn, work in the garden, or [employer]. Take the stairs instead of the elevator at work.  If you smoke, quit. Peoplewho smoke have an increased risk for heart attack, stroke, cancer, and other lung illnesses.  Quitting is hard, but there are ways to boost your chance of quitting tobacco for good.  Use nicotine gum, patches, or lozenges.  Ask your doctor about stop-smoking programs and medicines.  Keep trying.  In addition to reducing your risk of diseases in the future, you will notice some benefits soon after you stop using tobacco. If you have shortness of breath or asthma symptoms, they will likely getbetter within a few weeks after you quit.  Limit how much alcohol you drink. Moderate amounts of alcohol (up to 2 drinks a day for men, 1drink a day for women) are okay. But drinking too much can lead to liver problems, high blood pressure, and other health problems.  health   If you have a family, there are many things you can do together to improve your health.  Eat meals together as a family as often as possible.  Eat healthy foods. This includes fruits, vegetables, lean meats and dairy, and whole grains.  Include your family in your fitness plan. Most peoplethink of activities such as jogging or tennis as the way to fitness, but there are many ways you and your family can be more active. Anything that makes you breathe hard and gets your heart pumping is exercise. Here are sometips:   Walk to do errands or to take your child to school or the bus.  Go for a family bike ride after dinner instead of watching TV.  Where can you learn more?  Go toSPOOTNIC.COMtps://[employer]. org and sign in to your [employer] account. Enter Q373 in the Search HealthInformation box to learn more about \"A Healthy Lifestyle: Care Instructions. \"     If you do not have anaccount, please click on the \"Sign Up Now\" link.  Current as of: July 26, 2016   Content Version: 11.2   © 2262-3309 [employer]. Care instructions adapted under license by [facility] ([facility]). If you have questions about a medical condition or this instruction, always ask your healthcare professional. [employer] disclaims any warranty or liability for your use of this information.

## 2021-11-11 ENCOUNTER — OFFICE VISIT (OUTPATIENT)
Dept: NEUROLOGY | Age: 76
End: 2021-11-11
Payer: MEDICARE

## 2021-11-11 VITALS
HEART RATE: 59 BPM | DIASTOLIC BLOOD PRESSURE: 70 MMHG | BODY MASS INDEX: 43.19 KG/M2 | OXYGEN SATURATION: 96 % | SYSTOLIC BLOOD PRESSURE: 138 MMHG | HEIGHT: 68 IN | WEIGHT: 285 LBS

## 2021-11-11 DIAGNOSIS — R26.89 BALANCE PROBLEM: ICD-10-CM

## 2021-11-11 DIAGNOSIS — R20.0 NUMBNESS AND TINGLING: ICD-10-CM

## 2021-11-11 DIAGNOSIS — I48.91 ATRIAL FIBRILLATION, UNSPECIFIED TYPE (HCC): ICD-10-CM

## 2021-11-11 DIAGNOSIS — G63 POLYNEUROPATHY ASSOCIATED WITH UNDERLYING DISEASE (HCC): ICD-10-CM

## 2021-11-11 DIAGNOSIS — I67.82 CHRONIC CEREBRAL ISCHEMIA: ICD-10-CM

## 2021-11-11 DIAGNOSIS — I65.22 STENOSIS OF LEFT CAROTID ARTERY: ICD-10-CM

## 2021-11-11 DIAGNOSIS — R73.09 ELEVATED HEMOGLOBIN A1C: ICD-10-CM

## 2021-11-11 DIAGNOSIS — R20.2 NUMBNESS AND TINGLING: ICD-10-CM

## 2021-11-11 DIAGNOSIS — R41.3 MEMORY PROBLEM: ICD-10-CM

## 2021-11-11 DIAGNOSIS — E66.09 CLASS 2 OBESITY DUE TO EXCESS CALORIES WITHOUT SERIOUS COMORBIDITY WITH BODY MASS INDEX (BMI) OF 35.0 TO 35.9 IN ADULT: ICD-10-CM

## 2021-11-11 DIAGNOSIS — I65.23 BILATERAL CAROTID ARTERY STENOSIS: ICD-10-CM

## 2021-11-11 DIAGNOSIS — E11.42 TYPE 2 DIABETES MELLITUS WITH DIABETIC POLYNEUROPATHY, WITHOUT LONG-TERM CURRENT USE OF INSULIN (HCC): Primary | ICD-10-CM

## 2021-11-11 DIAGNOSIS — R29.898 WEAKNESS OF RIGHT LOWER EXTREMITY: ICD-10-CM

## 2021-11-11 DIAGNOSIS — M54.16 CHRONIC LUMBAR RADICULOPATHY: ICD-10-CM

## 2021-11-11 DIAGNOSIS — Z91.81 H/O FALLING: ICD-10-CM

## 2021-11-11 PROCEDURE — 99214 OFFICE O/P EST MOD 30 MIN: CPT | Performed by: PSYCHIATRY & NEUROLOGY

## 2021-11-11 RX ORDER — SPIRONOLACTONE 25 MG/1
12.5 TABLET ORAL DAILY
COMMUNITY
Start: 2020-12-02 | End: 2022-05-12

## 2021-11-11 RX ORDER — HYDRALAZINE HYDROCHLORIDE 10 MG/1
10 TABLET, FILM COATED ORAL 2 TIMES DAILY
COMMUNITY

## 2021-11-11 ASSESSMENT — ENCOUNTER SYMPTOMS
ABDOMINAL PAIN: 0
NAUSEA: 0
SORE THROAT: 0
VISUAL CHANGE: 0
COLOR CHANGE: 0
BOWEL INCONTINENCE: 0
WHEEZING: 0
PHOTOPHOBIA: 0
SHORTNESS OF BREATH: 0
BACK PAIN: 1
EYE REDNESS: 0
EYE ITCHING: 0
FACIAL SWELLING: 0
DIARRHEA: 0
CONSTIPATION: 0
VOMITING: 0
BLOOD IN STOOL: 0
SINUS PRESSURE: 0
ABDOMINAL DISTENTION: 0
EYE PAIN: 0
CHEST TIGHTNESS: 0
CHOKING: 0
EYE DISCHARGE: 0
APNEA: 0
TROUBLE SWALLOWING: 0
COUGH: 0
VOICE CHANGE: 0

## 2021-11-11 NOTE — PROGRESS NOTES
[facility]  Neurology  [address]:[phone]   Fax: [phone]      SUBJECTIVE:       PATIENT ID:  Cassie Fisher is a  RIGHT     HANDED 78 y.o. male. Back Pain  This is a chronic problem. The current episode started more than 1 year ago. The problem occurs intermittently. The problem has been gradually worsening since onset. The pain is present in the lumbar spine. The quality of the pain is described as aching and cramping. The pain does not radiate. The pain is at a severity of 3/10. The pain is mild. The pain is worse during the day. The symptoms are aggravated by standing and sitting. Stiffness is present all day. Associated symptoms include numbness, paresthesias, tingling and weakness. Pertinent negatives include no abdominal pain, bladder incontinence, bowel incontinence, chest pain, dysuria, fever, headaches, leg pain, paresis, pelvic pain, perianal numbness or weight loss. Risk factors include obesity, lack of exercise and sedentary lifestyle. He has tried home exercises and bed rest for the symptoms. The treatment provided mild relief. Neurologic Problem  The patient's primary symptoms include clumsiness, focal sensory loss, focal weakness, a loss of balance, memory loss and weakness. The patient's pertinent negatives include no altered mental status, near-syncope, slurred speech, syncope or visual change. This is a chronic problem. Episode onset: MORE    THAN   2  YEARS. The neurological problem developed gradually. The problem has been waxing and waning since onset. There was lower extremity, left-sided and right-sided focality noted. Associated symptoms include back pain and vertigo.  Pertinent negatives include no abdominal pain, auditory change, aura, bladder incontinence, bowel incontinence, chest pain, confusion, diaphoresis, dizziness, fatigue, fever, headaches, light-headedness, nausea, neck pain, palpitations, shortness of breath or vomiting. Past treatments include medication. The treatment provided no relief. His past medical history is significant for dementia. There is no history of a bleeding disorder, a clotting disorder, a CVA, head trauma, liver disease, mood changes or seizures. History obtained from  The patient     and other  available   medical  records   were  Also  reviewed. The  Duration,  Quality,  Severity,  Location,  Timing,  Context,  Modifying  Factors   Of   The   Chief   Complaint   And  Present  Illness       Was   Reviewed   In   Chronological   Manner.                                             PATIENT'S  MAIN  CONCERNS INCLUDE :                       1)   H/O   CHRONIC  LUMBAR   PAIN   AND  RADICULOPATHY                                             MORE  SO     RIGHT  SIDE                                                   -     STABLE                          2)     H/O    CHRONIC    WEAKNESS    AND  NUMBNESS   MORE  IN  THE                                         RIGHT  LOWER  EXTREMITY                                                         -     STABLE                            3)      TYPE  II  DM    SINCE     2005                                        WITH  ELEVATED    HEMOGLOBIN   A 1 C                             4)           PERIPHERAL POLYNEUROPATHY                                            MORE  SO  BOTH  LOWER  EXTREMITIES                                                      -    STABLE                                 H/O    EXPOSURE  TO  AGENT  ORANGE                            5)    H/O    MILD   BALANCE  PROBLEMS                                                  -  BETTER                           6)     PREVIOUS     H/O    RECURRENT  FALLS                                        SINCE   3     YEARS                                            NO   RECENT   FALLS                           7)    H/O    CHRONIC      MEMORY  PROBLEMS FOR     2 - 3     YEARS                                                              -    STABLE                                   NO  CORRECTABLE  ETIOLOGIES                                  PATIENT  NOT  INTERESTED    IN    MEDICATION  FOR                                         HIS   COGNITIVE  PROBLEMS. 8)     H/O   MULTIPLE  CO  MORBID  MEDICAL  CONDITIONS                                      BEING  FOLLOWED  BY  HIS    PCP                                 9)    H/O   CHRONIC       ATRIAL  FIBRILLATION                                         -   ON    ELIQUIS                                  BEING  FOLLOWED  BY   HIS   CARDIOLOGY                                10)     PREVIOUS    H/O      EVALUATIONS      AND  THERAPY                                         WITH   DR. MIGUEL                             11)        AT      RISK  FOR     STROKE,    CEREBRAL  ISCHEMIA                                              RADICULOPATHY ,    FALLING                                                  12)        HAD    NEURO  DIAGNOSTIC  EVALUATIONS  IN  SEPT. 2019                                     A) MRI  BRAIN     SHOWED  NO  ACUTE  PATHOLOGY                                      B)  CAROTID  DOPPLER     SHOWED     50 - 69  %    LEFT   STENOSIS                                                          -   NEEDS  MONITORING                                         C)     EMG    BOTH  LOWER  EXTREMITIES       SHOWED                                                             MODERATE  PERIPHERAL POLYNEUROPATHY                                                           AND  CHRONIC  LOWER  LUMBAR  RADICULOPATHY                                                              13)        PATIENT     HAD     THERAPY    IN   2020                                    AT  [facility]   IN    CONSULTATION    WITH    HIS  [facility]   PROVIDERS                                     WHICH    HELPED   HIM    AS  PER PATIENT. 14)     CAROTID  DOPPLER  IN      NOV. 2021     SHOWED                                   NO   HEMODYNAMICALLY  SIGNIFICANT     STENOSIS                                          15)     PATIENT  DENIES   ANY   NEW  NEUROLOGICAL  CONCERNS.                                   MAY  NEED    FOLLOW  UP  NEURO  DIAGNOSTIC    TESTING                                       IN  FUTURE  VISITS                                      16)     VARIOUS  RISK   FACTORS   WERE  REVIEWED   AND   DISCUSSED. PATIENT   HAS  MULTIPLE   MEDICAL, NEUROLOGICAL      PROBLEMS . PATIENT'S   MANAGEMENT  IS  CHALLENGING.                              PRECIPITATING  FACTORS: including  fever/infection, exertion/relaxation, position change, stress, weather change,                        medications/alcohol, time of day/darkness/light  Are  absent                                                              MODIFYING  FACTORS:  fever/infection, exertion/relaxation, position change, stress, weather change,                        medications/alcohol, time of day/darkness/light  Are  absent             Patient   Indicates   The  Presence   And  The  Absence  Of  The  Following    Associated  And           Additional  Neurological    Symptoms:                                Balance  And coordination   problems  present           Gait problems     present            Headaches      absent              Migraines           absent           Memory problemspresent              Confusion        absent            Paresthesia   numbness          present           Seizures  And  Starring  Episodes           absent           Syncope,  Near  syncopal episodes         absent           Speech   problems           absent             Swallowing   Problems      absent            Dizziness,  Light headedness           absent              Vertigo        absent Generalized   Weakness    absent              focal  Weakness     present             Tremors         absent              Sleep  Problems     absent             History  Of   Recent  Head  Injury     absent             History  Of   Recent  TIA     absent             History  Of   Recent    Stroke     absent             Neck  Pain   and   Neck muscle  Spasms  absent               Radiating  down   And   Weakness           absent            Lower back   Pain  And     Spasms  present              Radiating    Down   And   Weakness          present                H/OFALLS        present               History  Of   Visual  Symptoms    absent                  Associated   Diplopia       absent                                               Also   Additional   Symptoms   Present    As  Documented    In   The   detailed                  Review  Of  Systems   And    Please   Refer   To    Them for   Additional    Information. Any components  That are either  Unobtainable  Or  Limited  In   HPI, ROS  And/or PFSH   Are                   Due   ToPatient's  Medical  Problems,  Clinical  Condition   and/or lack of                                other    Alternate   resources.           RECORDS   REVIEWED:    historical medical records       INFORMATION   REVIEWED:     MEDICAL   HISTORY,SURGICAL   HISTORY,     MEDICATIONS   LIST,   ALLERGIES AND  DRUG  INTOLERANCES,       FAMILY   HISTORY,  SOCIAL  HISTORY,      PROBLEM  LIST   FOR  PATIENT  CARE   COORDINATION      Past Medical History:   Diagnosis Date    At[facility] fibrillation ([facility]     Cancer of kidney ([facility]     Stage 3    CHF (congestive heart failure) ([address] 2018    GERD (gastroesophageal reflux disease)     Hypertension     Type 2 diabetes mellitus ([facility]          Past Surgical History:   Procedure Laterality Date    APPENDECTOMY      CIRCUMCISION  2014    FOOT FRACTURE SURGERY      Right foot, Broken ankle    TONSILLECTOMY           Current Outpatient Medications   Medication Sig Dispense Refill    levothyroxine (SYNTHROID) 50 MCG tablet Take 50 mcg by mouth every morning (before breakfast)      amiodarone (CORDARONE) 200 MG tablet Take 200 mg by mouth 2 times daily      triamcinolone (ARISTOCORT) 0.5 % cream apply to affected area twice a day      torsemide (DEMADEX) 20 MG tablet Take 40 mg by mouth daily      Cholecalciferol (VITAMIN D3) 1.25 MG (22732 UT) CAPS Take by mouth      Multiple Vitamins-Minerals (MULTIVITAMIN ADULT PO) Take by mouth      dronedarone hcl (MULTAQ) 400 MG TABS Take 400 mg by mouth 2 times daily (with meals)      atorvastatin (LIPITOR) 10 MG tablet Take 10 mg by mouth daily Half tablet daily      apixaban (ELIQUIS) 5 MG TABS tablet Take 5 mg by mouth 2 times daily      furosemide (LASIX) 20 MG tablet Take 20 mg by mouth daily      metoprolol tartrate (LOPRESSOR) 50 MG tablet Take 50 mg by mouth 2 times daily      ROSIGLITAZONE-GLIMEPIRIDE PO Take 45 mg by mouth daily      blood glucose test strips (GMATE BLOOD GLUCOSE TEST) strip 1 each by In Vitro route daily As needed.  Lancets MISC 1 each by Does not apply route daily      Insulin Pen Needle (PEN NEEDLES) 31G X 6 MM MISC 1 each by Does not apply route daily      Insulin Pen Needle (B-D UF III MINI PEN NEEDLES) 31G X 5 MM MISC 1 each by Does not apply route daily      glimepiride (AMARYL) 2 MG tablet Take 2 mg by mouth every morning (before breakfast) Take half tablet to 1 tablet every morning. Hold if blood sugar going low.  Liraglutide (VICTOZA SC) Inject 1.8 mg into the skin daily       No current facility-administered medications for this visit.          No Known Allergies      Family History   Problem Relation Age of Onset    Stroke Mother     Heart Disease Father     Heart Attack Father          Social History     Socioeconomic History    Marital status:      Spouse name: Not on file    Number of children: Not on file    Years of education: Not on file    Highest education level: Not on file   Occupational History    Not on file   Tobacco Use    Smoking status: Former Smoker     Years: 10.00    Smokeless tobacco: Never Used    Tobacco comment: Age 14 to 31's   Vaping Use    Vaping Use: Never used   Substance and Sexual Activity    Alcohol use: Not on file    Drug use: Not on file    Sexual activity: Not on file   Other Topics Concern    Not on file   Social History Narrative    Not on file     Social Determinants of Health     Financial Resource Strain:     Difficulty of Paying Living Expenses: Not on file   Food Insecurity:     Worried About [address] [employer] in the Last Year: Not on file    Eulogio of Food in the Last Year: Not on file   Transportation Needs:     Lack of Transportation (Medical): Not on file    Lack of Transportation (Non-Medical):  Not on file   Physical Activity:     Days of Exercise per Week: Not on file    Minutes of Exercise per Session: Not on file   Stress:     Feeling of Stress : Not on file   Social Connections:     Frequency of Communication with Friends and Family: Not on file    Frequency of Social Gatherings with Friends and Family: Not on file    Attends [employer] Services: Not on file    Active Member of [address] [place] [address] or Organizations: Not on file    Attends Club or Organization Meetings: Not on file    Marital Status: Not on file   Intimate Partner Violence:     Fear of Current or Ex-Partner: Not on file    Emotionally Abused: Not on file    Physically Abused: Not on file    Sexually Abused: Not on file   Housing Stability:     Unable to Pay for Housing in the Last Year: Not on file    Number of [place] in the Last Year: Not on file    Unstable Housing in the Last Year: Not on file       Vitals:    11/11/21 1106   BP: (!) 140/78   Pulse: 59   SpO2: 96%         Wt Readings from Last 3 Encounters:   11/11/21 285 lb (129.3 kg)   05/14/21 282 lb (127.9 kg)   11/04/20 281 lb (127.5 kg) BP Readings from Last 3 Encounters:   11/11/21 (!) 140/78   05/14/21 128/72   11/04/20 118/70         Review of Systems   Constitutional: Negative for appetite change, chills, diaphoresis, fatigue, fever, unexpected weight change and weight loss. HENT: Negative for congestion, dental problem, drooling, ear discharge, ear pain, facial swelling, hearing loss, mouth sores, nosebleeds, postnasal drip, sinus pressure, sore throat, tinnitus, trouble swallowing and voice change. Eyes: Negative for photophobia, pain, discharge, redness, itching and visual disturbance. Respiratory: Negative for apnea, cough, choking, chest tightness, shortness of breath and wheezing. Cardiovascular: Negative for chest pain, palpitations, leg swelling and near-syncope. Gastrointestinal: Negative for abdominal distention, abdominal pain, blood in stool, bowel incontinence, constipation, diarrhea, nausea and vomiting. Endocrine: Negative for cold intolerance, heat intolerance, polydipsia, polyphagia and polyuria. Genitourinary: Negative for bladder incontinence, dysuria and pelvic pain. Musculoskeletal: Positive for back pain and gait problem. Negative for arthralgias, joint swelling, myalgias, neck pain and neck stiffness. Skin: Negative for color change, pallor, rash and wound. Allergic/Immunologic: Negative for environmental allergies, food allergies and immunocompromised state. Neurological: Positive for vertigo, tingling, focal weakness, weakness, numbness, paresthesias and loss of balance. Negative for dizziness, tremors, seizures, syncope, facial asymmetry, speech difficulty, light-headedness and headaches. Hematological: Negative for adenopathy. Does not bruise/bleed easily. Psychiatric/Behavioral: Positive for decreased concentration and memory loss. Negative for agitation, behavioral problems, confusion, dysphoric mood, hallucinations, self-injury, sleep disturbance and suicidal ideas.  The patient is not nervous/anxious and is not hyperactive. OBJECTIVE:    Physical Exam  Constitutional:       Appearance: He is well-developed. HENT:      Head: Normocephalic and atraumatic. No raccoon eyes or Ritter's sign. Right Ear: External ear normal.      Left Ear: External ear normal.      Nose: Nose normal.   Eyes:      Conjunctiva/sclera: Conjunctivae normal.   Neck:      Thyroid: No thyroid mass or thyromegaly. Vascular: No carotid bruit. Trachea: No tracheal deviation. Meningeal: Brudzinski's sign and Kernig's sign absent. Cardiovascular:      Rate and Rhythm: Normal rate and regular rhythm. Pulmonary:      Effort: Pulmonary effort is normal.   Musculoskeletal:         General: No tenderness. Cervical back: Normal range of motion and neck supple. No rigidity. No muscular tenderness. Normal range of motion. Skin:     General: Skin is warm. Coloration: Skin is not pale. Findings: No erythema or rash. Nails: There is no clubbing. Psychiatric:         Attention and Perception: He is attentive. Mood and Affect: Mood is not anxious or depressed. Affect is not labile, blunt, angry or inappropriate. Behavior: Behavior is not agitated, slowed, aggressive, withdrawn, hyperactive or combative. Behavior is cooperative. Thought Content: Thought content is not paranoid or delusional. Thought content does not include homicidal or suicidal ideation. Thought content does not include homicidal or suicidal plan. Cognition and Memory: Memory is impaired. He does not exhibit impaired recent memory or impaired remote memory. Judgment: Judgment is not impulsive or inappropriate. NEUROLOGICALEXAMINATION :      A) MENTAL STATUS:                   Alert and  oriented  To time, place  And  Person. No Aphasia. No  Dysarthria. Able   To  Follow   SIMPLE    commands   without   Any  Difficulty. No right  To left confusion. Normal  Speech  And language function. Insight and  Judgment ,Fund  Of  Knowledge   within normal limits                Recent  And  Remote memory  DECREASED                Attention &  Concentration are    DECREASED                                                 B) CRANIAL NERVES :      CN : Visual  Acuity  And  Visual fields  within normal limits               Fundi  Could  Not  Be  Could  Not  Be  Evaluated. 3,4,6 CN : Both  Pupils are   Reactive and  Equal.  Movements  Are  Intact. No  Nystagmus. No  VASHTI. No  Afferent  Pupillary  Defect noted. 5 CN :  Normal  Facial sensations and Corneal  Reflexes           7 CN:  Normal  Facial  Symmetry  And  Strength. No facial  Weakness. 8 CN :  Hearing  Appears    DECREASED            9, 10 CN: Normal   spontaneous, reflex   palate   movements         11 CN:   Normal  Shoulder  shrug and  strength         12 CN :   Normal  Tongue movements and  Tongue  In midline                        No tongue   Fasciculations or atrophy       C) MOTOR  EXAM:                 Strength  In upper  5/5      And  Lower   extremities  3+/5   RIGHT     AND     4+/5   IN  LEFT                    Rapid   alternating  And  repetitions  Movements    DECREASED                   Muscle  Tone  In upper  And  Lower  Extremities  normal                No rigidity. No  Spasticity. Bradykinesia   absent               No  Asterixis. Sustention  Tremor , Resting   Tremor   absent                    No   other  Abnormal  Movements noted           D) SENSORY :               Light   touch, pinprick,   position  And  Vibration   IMPAIRED  IN                             GLOVE  AND  STOCKING  MANNER        E) REFLEXES:                   Deep  Tendon  Reflexes     DECREASED                    No  pathological  Reflexes  Bilaterally. F) COORDINATION  AND  GAIT :                                Station and  Gait    SLOW                                Romberg 's test   POSITIVE                            Ataxia negative          ASSESSMENT:      Patient Active Problem List   Diagnosis    Type 2 diabetes mellitus [facility]    Hypertension    CHF (congestive heart failure) (HCC)    Atrial fibrillation (HCC)    GERD (gastroesophageal reflux disease)    Chronic lumbar radiculopathy    Class 2 obesity due to excess calories without serious comorbidity with body mass index (BMI) of 35.0 to 35.9 in adult    Weakness of right lower extremity    Numbness and tingling    Polyneuropathy associated with underlying disease ([address]    H/O falling    Balance problem    Chronic cerebral ischemia    Memory problem    Bilateral carotid artery stenosis    Elevated hemoglobin A1c    Type 2 diabetes mellitus with diabetic polyneuropathy, without long-term current use of insulin ([address]     ULTRASOUND EVALUATION OF THE CAROTID ARTERIES       11/5/2020       COMPARISON:   09/13/2019       HISTORY:   ORDERING SYSTEM PROVIDED HISTORY: Essential hypertension       Left carotid stenosis, history of falling       FINDINGS:       RIGHT:       The right common carotid artery demonstrates peak systolic velocities of 76   and 53 cm/sec in the proximal and distal segments respectively.       The right internal carotid artery demonstrates the systolic velocities of 62,   66, and 76 cm/sec in the proximal, mid and distal segments respectively.       The external carotid artery is patent.  The vertebral artery demonstrates   normal antegrade flow.       No evidence of focal atherosclerotic plaque.       ICA/CCA ratio of 1.1.           LEFT:       The left common carotid artery demonstrates peak systolic velocities of 71   and 63 cm/sec in the proximal and distal segments respectively.       The left internal carotid artery demonstrates the systolic velocities of 69,   68, and 83 cm/sec in the proximal, mid and distal segments respectively.       The external carotid artery is patent.  The vertebral artery demonstrates   normal antegrade flow.       Mild hard plaque in the proximal internal carotid artery with measured   stenosis of up to 21%.       ICA/CCA ratio of 1.3.           Impression   The right internal carotid artery demonstrates 0-50% stenosis .       The left internal carotid artery demonstrates 0-50% stenosis .       Bilateral vertebral arteries are patent with flow in the normal direction.               MRI OF THE BRAIN WITHOUT CONTRAST  9/13/2019 10:50 am       TECHNIQUE:   Multiplanar multisequence MRI of the brain was performed without the   administration of intravenous contrast.       COMPARISON:   None.       HISTORY:   ORDERING SYSTEM PROVIDED HISTORY: H/O falling   TECHNOLOGIST PROVIDED HISTORY:   Reason for Exam:  Loss of feeling in right leg for one to two years. Acuity: Chronic   Type of Exam: Unknown   Additional signs and symptoms: H/O falling; Balance problem; Chronic cerebral   ischemia; Memory problem.       FINDINGS:   INTRACRANIAL STRUCTURES/VENTRICLES: The sellar and suprasellar structures,   optic chiasm, corpus callosum, pineal gland, tectum, and midline brainstem   structures are unremarkable.  The craniocervical junction is unremarkable. There is no acute intracranial hemorrhage, mass effect, or midline shift. There is satisfactory overall gray-white matter differentiation.  The   ventricular structures are symmetric and unremarkable.  The infratentorial   structures including the cerebellopontine angles and internal auditory canals   are unremarkable. There is no abnormal restricted diffusion.  There is no   abnormal blooming artifact on susceptibility weighted imaging.       ORBITS: The visualized portion of the orbits demonstrate no acute abnormality.       SINUSES: The visualized paranasal sinuses and mastoid air cells are well aerated.       BONES/SOFT TISSUES: The bone marrow signal intensity appears normal. The soft   tissues demonstrate no acute abnormality.           Impression   Unremarkable MRI of the brain.             ULTRASOUND EVALUATION OF THE CAROTID ARTERIES       9/13/2019       COMPARISON:   None       HISTORY:   ORDERING SYSTEM PROVIDED HISTORY: H/O falling   TECHNOLOGIST PROVIDED HISTORY:   Reason for Exam: memory problems   Acuity: Acute   Type of Exam: Initial   Relevant Medical/Surgical History: HTN, HLD and diabetes       FINDINGS:       RIGHT:       The right common carotid artery demonstrates peak systolic velocities of 66   and 55 cm/sec in the proximal and distal segments respectively.       The right internal carotid artery demonstrates the systolic velocities of 32,   51, and 58 cm/sec in the proximal, mid and distal segments respectively.       The external carotid artery is patent.  The vertebral artery demonstrates   normal antegrade flow.       Atherosclerotic disease is present at the bifurcation.       ICA/CCA ratio of 0.6.           LEFT:       The left common carotid artery demonstrates peak systolic velocities of 87,   and 68 cm/sec in the proximal and distal segments respectively.       The left internal carotid artery demonstrates the systolic velocities of 50,   97, and 78 cm/sec in the proximal, mid and distal segments respectively.       The external carotid artery is patent.  The vertebral artery demonstrates   normal antegrade flow.       Atherosclerotic disease is present at the bifurcation.       There is approximately 52% stenosis of left proximal internal carotid artery   secondary to irregular plaque.  This is according to diameter measurement.       ICA/CCA ratio of 0.6.           Impression   1. The left internal carotid artery demonstrates 50-69% stenosis by diameter   measurement.       2.  The right internal carotid artery demonstrates 0-50% stenosis.       3. Bilateral vertebral arteries are patent with flow in the normal direction.       4. Atherosclerotic disease at the bilateral carotid bifurcation.       RECOMMENDATIONS:   Follow-up CTA neck. VISITING DIAGNOSIS:          ICD-10-CM    1. Type 2 diabetes mellitus with diabetic polyneuropathy, without long-term current use of insulin (HCC)  E11.42    2. Chronic lumbar radiculopathy  M54.16    3. Elevated hemoglobin A1c  R73.09    4. Atrial fibrillation, unspecified type [facility]  I48.91    5. Numbness and tingling  R20.0     R20.2    6. Chronic cerebral ischemia  I67.82    7. Class 2 obesity due to excess calories without serious comorbidity with body mass index (BMI) of 35.0 to 35.9 in adult  E66.09     Z68.35    8. Bilateral carotid artery stenosis  I65.23    9. Polyneuropathy associated with underlying disease ([facility]  G63    10. Memory problem  R41.3    11. H/O falling  Z91.81    12. Balance problem  R26.89    13. Stenosis of left carotid artery  I65.22    14. Weakness of right lower extremity  R29.898                   CONCERNS   &   INCREASED   RISK   FOR        * TIA,  CEREBRO  VASCULAR  ISCHEMIA,STROKE      *   COGNITIVE  &   MEMORY PROBLEMS  AND  DEMENTIAS       *  MONONEUROPATHIES,   RADICULOPATHY       *   PERIPHERAL  NEUROPATHY,   NEUROPATHIC  PAIN      *  GAIT  DIFFICULTIES  &   BALANCE PROBLMES   AND  FALL                VARIOUS  RISK   FACTORS   WERE  REVIEWED   AND   DISCUSSED. *  PATIENT   HAS  MULTIPLE   MEDICAL, NEUROLOGICAL      PROBLEMS . PATIENT'S   MANAGEMENT  IS  CHALLENGING. PLAN:                         Diana Wilson  Of  The  Diagnoses,  The  Management & Treatment  Options            AND    Care  plan  Were          Reviewed and   Discussed   With  patient. * Goals  And  Expectations  Of  The  Therapy  Discussed   And  Reviewed.           *   Benefits   And   Side  Effect  Profile  Of  Medication/s   Were   Discussed                * Need   For  Further   Follow up For  The Various  Problems Were  discussed. * Results  Of  The  Previous  Diagnostic tests were reviewed and  discussed                 Medical  Decision  Making  Was  Made  Based on the   Complexity  Of  Above  Mentioned  Diagnoses,    Data reviewed             And    Risk  Of  Significant   Co morbidities and   complicating   Factors. Medical  Decision  Was   High    Complexity   Due   To  The  Patient's  Multiple  Symptoms,      Advancing   Disease,  Complex  Treatment  Regimen,  Multiple medications           and   Risk  Of   Side  Effects,  Difficulty  In  Medication  Management  And  Diagnostic  Challenges       In  View  Of  The  Associated   Co  Morbid  Conditions   And  Problems. * FALL   PRECAUTIONS. THESE  REVIEWED   AND  DISCUSSED    *  USE   WALKING  ASSISTANCE  DEVICES     QUAD  CANE          *   ABSOLUTELY   NO  DRIVING      *   BE  CAREFUL  WITH  ACTIVITIES  . *   AVOID   NECK  AND/ BACK  STRAINING  ACTIVITIES      *   TO   WEAR   BILATERAL   WRIST  BRACES       *   TO  AVOID  PRESSURE  OVER   ULNAR  ASPECT   OF   ELBOWS        *   ADEQUATE   FLUIDINTAKE   AND  ELECTROLYTE  BALANCE             * KEEP  DAIRY  OF   THE  NEUROLOGICAL  SYMPTOMS        RECORDING THE    DURATION  AND  FREQUENCY. *  AVOID    CONDITIONS  AND  FACTORS   THAT  MAKE                  NEUROLOGICAL  SYMPTOMS  WORSE.                    *TO  MAINTAIN  REGULAR  SLEEP  WAKE  CYCLES. *   TO  HAVE  ADEQUATE  REST  AND   SLEEP    HOURS.          *    TO   AVOID   TO  SLEEP  IN   SUPINE  POSITION. *      WEIGHT   LOSS. *    AVOID  ANY USAGE OF    TOBACCO,              EXCESSIVE  ALCOHOL  AND   ILLEGAL   SUBSTANCES        *   Compliance   With  Medications   And  Instructions        * CURRENTLY    TOLERATING  THE  PRESCRIBED   MEDICATIONS. WITHOUT  ANY  SIGNIFICANT  SIDE  EFFECTS   &  GETTING BENEFIT.           *    Prophylactic  Use   Of     Vitamin   B Complex,  Folic  Acid,    Vitamin  B12    Multivitamin,       Calcium  With  magnesium  And  Vit D    Supplementations   Over  The  Counter  Discussed         *FOOT  CARE, DAILY  INSPECTION  OF  FEET   AND         PERIODIC  PODIATRY EVALUATIONS . *  PATIENT  IS  ALSO   COUNSELED   TO  KEEP    ACTIVITIES:         A)   SIMPLE      B)  ORGANIZED      C)  WRITEDOWN                       *  EVALUATIONS  AND  FOLLOW UP:                 * PHYSICAL  THERAPY                                 *CARDIOLOGY              *   OPTHALMOLOGY                                                   *       H/O    CHRONIC      MEMORY  PROBLEMS                                            FOR     2  -3    YEARS                                      -  STABLE                                                *         PATIENT  NOT  INTERESTED    IN    MEDICATION  FOR                                         HIS   COGNITIVE  PROBLEMS.                                                 *       H/O   CHRONIC       ATRIAL  FIBRILLATION                                         -   ON    ELIQUIS                                  BEING  FOLLOWED  BY   HIS   CARDIOLOGY                           *         PATIENT  DENIES   ANY   NEW  NEUROLOGICAL  CONCERNS.                                   MAY  NEED    FOLLOW  UP  NEURO  DIAGNOSTIC    TESTING                                       IN  FUTURE  VISITS                       *PATIENT   TO  FOLLOW  UP  WITH   PRIMARY  CARE         OTHER  CONSULTANTS  AS  BEFORE. *  Maintain   Healthy  Life Style    With   Periodic  Monitoring  Of      Any  Medical  Conditions  Including   Elevated  Blood  Pressure,  Lipid  Profile,     Blood  Sugar levels  AndHeart  Disease. *   Period   Screening  For  Cancers  Involving  Breast,  Colon,    lungs  And  Other  Organs  As  Applicable,  In consultation   With  Your  Primary Care Providers.               *Second  Neurological  Opinion  And  Evaluations  In [facility]  Setting  If  Patient  Is  Interested. * Please   Contact   Neurology  Clinic   Early   If   Are  Any  New  Neurological   And  Any neurological  Concerns. *  If  The  Patient remains  Neurologically  Stable   Return   To  [facility] Neurology Department   IN   6         MONTHS  TIME   FOR  FURTHER              FOLLOW UP.                           *   The  Neurological   Findings,  Possible  Diagnosis,  Differential diagnoses                    And  Options  For    Further   Investigations                   And  management   Are  Discussed  Comprehensively. Medications   And  Prescription   Risks  And  Side effects  Are   Also  Discussed. *  If   There is  Any  Significant  Worsening   Of  Current  Symptoms  And  Or                  If patient  Develops   Any additional  New  NeurologicalSymptoms                  Or  Significant  Concerns   Should  Call  911 or  Go  To  Emergency  Department                     For  Further  Immediate  Evaluation. The   Above  Were  Reviewed  With  patient   and                       questions  Answered  In  Detail. More   Than  50% of face  To face Time   Was  Spent  On  Counseling                    And   Coordination  Of  Care   Of   Patient's  multiple   Neurological  Problems                         And   Comorbid  Medical   Conditions. Electronically signed by Liliam Tom MD.,   [facility]      Board Certified in  Neurology &  In  [employer] [address] of Psychiatry and Neurology ([employer])      DISCLAIMER:   Although every effort was made to ensure the accuracy of this  electronictranscription, some errors in transcription may have occurred.       GENERAL PATIENT INSTRUCTIONS:      A Healthy Lifestyle: Care Instructions   Your Care Instructions   A healthy lifestyle can help you feel good, stay at ahealthy weight, and have plenty of energy for both work and play. A healthy lifestyle is something you can share with your whole family.  A healthy lifestyle also can lower your risk for serious health problems, such ashigh blood pressure, heart disease, and diabetes.  You can follow a few steps listed below to improve your health and the health of your family.  Follow-up careis a key part of your treatment and safety. Be sure to make and go to all appointments, and call your doctor if you are having problems. Its also a good idea to know your test results and keep a list of the medicines you take.  How can you care for yourself at home?  Do not eat too much sugar, fat, or fast foods. You can still have dessert and treats nowand then. The goal is moderation.  Start small to improve your eating habits. Pay attention to portion sizes, drink less juice and soda pop, and eat more fruits and vegetables.  Eat a healthy amount of food. A 3-ounce serving of meat, for example, is about the size of a deck of cards. Fill the rest of your plate with vegetables and whole grains.  Limit theamount of soda and sports drinks you have every day. Drink more water when you are thirsty.  Eat at least 5 servings of fruits and vegetables every day. It may seem like a lot, but it is not hard to reach this goal. Aserving or helping is 1 piece of fruit, 1 cup of vegetables, or 2 cups of leafy, raw vegetables. Have an apple or some carrot sticks as an afternoon snack instead of a candy bar. Try to have fruits and/or vegetables at everymeal.   Make exercise part of your daily routine. You may want to start with simple activities, such as walking, bicycling, or slow swimming. Try aislinn active 30 to 60 minutes every day. You do not need to do all 30 to 60 minutes all at once. For example, you can exercise 3 times a day for 10 or 20 minutes.  Moderate exercise is safe for most people, but it is always agood idea to talk to your doctor before starting an exercise program.   Keep moving. [employer] the lawn, work in the garden, or [employer]. Take the stairs instead of the elevator at work.  If you smoke, quit. Peoplewho smoke have an increased risk for heart attack, stroke, cancer, and other lung illnesses. Quitting is hard, but there are ways to boost your chance of quitting tobacco for good.  Use nicotine gum, patches, or lozenges.  Ask your doctor about stop-smoking programs and medicines.  Keep trying.  In addition to reducing your risk of diseases in the future, you will notice some benefits soon after you stop using tobacco. If you have shortness of breath or asthma symptoms, they will likely getbetter within a few weeks after you quit.  Limit how much alcohol you drink. Moderate amounts of alcohol (up to 2 drinks a day for men, 1drink a day for women) are okay. But drinking too much can lead to liver problems, high blood pressure, and other health problems.  health   If you have a family, there are many things you can do together to improve your health.  Eat meals together as a family as often as possible.  Eat healthy foods. This includes fruits, vegetables, lean meats and dairy, and whole grains.  Include your family in your fitness plan. Most peoplethink of activities such as jogging or tennis as the way to fitness, but there are many ways you and your family can be more active. Anything that makes you breathe hard and gets your heart pumping is exercise. Here are sometips:   Walk to do errands or to take your child to school or the bus.  Go for a family bike ride after dinner instead of watching TV.  Where can you learn more?  Go [email or web address] and sign in to your [employer] account. Enter D211 in the Search HealthInformation box to learn more about \"A Healthy Lifestyle: Care Instructions. \"     If you do not have anaccount, please click on the \"Sign Up Now\" link.   Current as of: July 26, 2016   Content Version: 11.2   © 6501-5557 [employer] Care instructions adapted under license by [facility] ([facility]). If you have questions about a medical condition or this instruction, always ask your healthcare professional. [employer] disclaims any warranty or liability for your use of this information.

## 2021-11-11 NOTE — PATIENT INSTRUCTIONS
* FALL   PRECAUTIONS. *  USE   WALKING  ASSISTANCE  DEVICES     QUAD  CANE  /   Andra Mantle      *    TO   AVOID   TO  SLEEP  IN   SUPINE  POSITION. *      WEIGHT   LOSS. *   ADEQUATE   FLUID  INTAKE   AND  ELECTROLYTE  BALANCE           * KEEP  DAIRY  OF   THE  NEUROLOGICAL  SYMPTOMS          *  TO  MAINTAIN  REGULAR  SLEEP  WAKE  CYCLES. *   TO  HAVE  ADEQUATE  REST  AND   SLEEP    HOURS.        *    AVOID  USAGE OF   TOBACCO,  EXCESSIVE  ALCOHOL                AND   ILLEGAL   SUBSTANCES,  IF  ANY          *  Maintain   Healthy  Life Style    With   Periodic  Monitoring  Of      Any  Medical  Conditions  Including   Elevated  Blood  Pressure,  Lipid  Profile,     Blood  Sugar levels  And   Heart  Disease. *   Period   Screening  For  Cancers  Involving  Breast,  Colon,   lungs  And  Other  Organs  As  Applicable,  In consultation   With  Your  Primary Care Providers. *  If   There is  Any  Significant  Worsening   Of  Current  Symptoms  And  Or  If    Any additional  New  Neurological  Symptoms                 Or  Significant  Concerns   Should  Call  911 or  Go  To  Emergency  Department  For  Further  Immediate  Evaluation.

## 2022-05-12 ENCOUNTER — OFFICE VISIT (OUTPATIENT)
Dept: NEUROLOGY | Age: 77
End: 2022-05-12
Payer: MEDICARE

## 2022-05-12 VITALS
BODY MASS INDEX: 38.77 KG/M2 | WEIGHT: 255 LBS | SYSTOLIC BLOOD PRESSURE: 134 MMHG | RESPIRATION RATE: 16 BRPM | DIASTOLIC BLOOD PRESSURE: 80 MMHG | OXYGEN SATURATION: 97 % | HEART RATE: 69 BPM

## 2022-05-12 DIAGNOSIS — Z91.81 H/O FALLING: ICD-10-CM

## 2022-05-12 DIAGNOSIS — R41.3 MEMORY PROBLEM: ICD-10-CM

## 2022-05-12 DIAGNOSIS — R26.89 BALANCE PROBLEM: ICD-10-CM

## 2022-05-12 DIAGNOSIS — R29.898 WEAKNESS OF RIGHT LOWER EXTREMITY: ICD-10-CM

## 2022-05-12 DIAGNOSIS — R73.09 ELEVATED HEMOGLOBIN A1C: ICD-10-CM

## 2022-05-12 DIAGNOSIS — I48.91 ATRIAL FIBRILLATION, UNSPECIFIED TYPE (HCC): ICD-10-CM

## 2022-05-12 DIAGNOSIS — R20.0 NUMBNESS AND TINGLING: ICD-10-CM

## 2022-05-12 DIAGNOSIS — I67.82 CHRONIC CEREBRAL ISCHEMIA: ICD-10-CM

## 2022-05-12 DIAGNOSIS — M54.16 CHRONIC LUMBAR RADICULOPATHY: ICD-10-CM

## 2022-05-12 DIAGNOSIS — E11.42 DIABETIC PERIPHERAL NEUROPATHY ASSOCIATED WITH TYPE 2 DIABETES MELLITUS (HCC): Primary | ICD-10-CM

## 2022-05-12 DIAGNOSIS — I65.23 BILATERAL CAROTID ARTERY STENOSIS: ICD-10-CM

## 2022-05-12 DIAGNOSIS — E66.09 CLASS 2 OBESITY DUE TO EXCESS CALORIES WITHOUT SERIOUS COMORBIDITY WITH BODY MASS INDEX (BMI) OF 35.0 TO 35.9 IN ADULT: ICD-10-CM

## 2022-05-12 DIAGNOSIS — R20.2 NUMBNESS AND TINGLING: ICD-10-CM

## 2022-05-12 DIAGNOSIS — G63 POLYNEUROPATHY ASSOCIATED WITH UNDERLYING DISEASE (HCC): ICD-10-CM

## 2022-05-12 PROCEDURE — 99214 OFFICE O/P EST MOD 30 MIN: CPT | Performed by: PSYCHIATRY & NEUROLOGY

## 2022-05-12 RX ORDER — POTASSIUM CHLORIDE 20 MEQ/1
TABLET, EXTENDED RELEASE ORAL
COMMUNITY
Start: 2022-03-21

## 2022-05-12 RX ORDER — ISOSORBIDE DINITRATE 10 MG/1
10 TABLET ORAL
COMMUNITY
Start: 2022-03-21

## 2022-05-12 RX ORDER — CARVEDILOL 3.12 MG/1
TABLET ORAL
COMMUNITY
Start: 2022-03-21

## 2022-05-12 RX ORDER — FLUTICASONE FUROATE, UMECLIDINIUM BROMIDE AND VILANTEROL TRIFENATATE 200; 62.5; 25 UG/1; UG/1; UG/1
POWDER RESPIRATORY (INHALATION)
COMMUNITY
Start: 2022-04-11

## 2022-05-12 RX ORDER — NITROGLYCERIN 0.4 MG/1
TABLET SUBLINGUAL
COMMUNITY
Start: 2022-01-28 | End: 2023-01-28

## 2022-05-12 RX ORDER — INSULIN LISPRO 100 [IU]/ML
INJECTION, SOLUTION INTRAVENOUS; SUBCUTANEOUS
COMMUNITY
Start: 2022-03-26

## 2022-05-12 RX ORDER — SODIUM CHLORIDE 0.65 %
AEROSOL, SPRAY (ML) NASAL
COMMUNITY
Start: 2022-03-21

## 2022-05-12 RX ORDER — AMLODIPINE BESYLATE 10 MG/1
5 TABLET ORAL
COMMUNITY
Start: 2022-03-21

## 2022-05-12 RX ORDER — ACETAZOLAMIDE 250 MG/1
250 TABLET ORAL
COMMUNITY
Start: 2022-03-21

## 2022-05-12 RX ORDER — BUMETANIDE 1 MG/1
TABLET ORAL
COMMUNITY
Start: 2022-03-21

## 2022-05-12 ASSESSMENT — ENCOUNTER SYMPTOMS
BACK PAIN: 1
CONSTIPATION: 0
ABDOMINAL DISTENTION: 0
PHOTOPHOBIA: 0
FACIAL SWELLING: 0
BOWEL INCONTINENCE: 0
EYE REDNESS: 0
VOMITING: 0
TROUBLE SWALLOWING: 0
EYE DISCHARGE: 0
SORE THROAT: 0
EYE ITCHING: 0
VOICE CHANGE: 0
NAUSEA: 0
CHEST TIGHTNESS: 0
CHOKING: 0
EYE PAIN: 0
SINUS PRESSURE: 0
APNEA: 0
VISUAL CHANGE: 0
COUGH: 0
WHEEZING: 0
SHORTNESS OF BREATH: 0
ABDOMINAL PAIN: 0
DIARRHEA: 0
COLOR CHANGE: 0
BLOOD IN STOOL: 0

## 2022-05-12 ASSESSMENT — PATIENT HEALTH QUESTIONNAIRE - PHQ9
1. LITTLE INTEREST OR PLEASURE IN DOING THINGS: 0
SUM OF ALL RESPONSES TO PHQ QUESTIONS 1-9: 0
SUM OF ALL RESPONSES TO PHQ9 QUESTIONS 1 & 2: 0
SUM OF ALL RESPONSES TO PHQ QUESTIONS 1-9: 0
SUM OF ALL RESPONSES TO PHQ QUESTIONS 1-9: 0
2. FEELING DOWN, DEPRESSED OR HOPELESS: 0
SUM OF ALL RESPONSES TO PHQ QUESTIONS 1-9: 0

## 2022-05-12 NOTE — PATIENT INSTRUCTIONS
* FALL   PRECAUTIONS. *  USE   WALKING  ASSISTANCE  DEVICES     QUAD  CANE  /   WALKER          *   ADEQUATE   FLUID  INTAKE   AND  ELECTROLYTE  BALANCE             * KEEP  DAIRY  OF   THE  NEUROLOGICAL  SYMPTOMS          *  TO  MAINTAIN  REGULAR  SLEEP  WAKE  CYCLES. *   TO  HAVE  ADEQUATE  REST  AND   SLEEP    HOURS.          *    AVOID  USAGE OF   TOBACCO,  EXCESSIVE  ALCOHOL                AND   ILLEGAL   SUBSTANCES,  IF  ANY          *  Maintain   Healthy  Life Style    With   Periodic  Monitoring  Of         Any  Medical  Conditions  Including   Elevated  Blood  Pressure,  Lipid  Profile,       Blood  Sugar levels  And   Heart  Disease. *   Period   Screening  For  Cancers  Involving  Breast,  Colon,         Lungs  And  Other  Organs  As  Applicable,           In consultation   With  Your  Primary Care Providers. *  If   There is  Any  Significant  Worsening   Of  Current  Symptoms  And             Or  If    Any additional  New  Neurological  Symptoms  and          Significant  Concerns   Should  Call  911 or  Go  To  Emergency  Department            For  Further  Immediate  Evaluation.

## 2022-05-12 NOTE — PROGRESS NOTES
[facility]  Neurology  [address] [place] [address]  IYDZ:[phone]   Fax: [phone]      SUBJECTIVE:       PATIENT ID:  Cathi Donovan is a  RIGHT     HANDED 78 y.o. male. Back Pain  This is a chronic problem. The current episode started more than 1 year ago. The problem occurs intermittently. The problem has been gradually worsening since onset. The pain is present in the lumbar spine. The quality of the pain is described as aching and cramping. The pain does not radiate. The pain is at a severity of 3/10. The pain is mild. The pain is worse during the day. The symptoms are aggravated by standing and sitting. Stiffness is present all day. Associated symptoms include paresthesias and weakness. Pertinent negatives include no abdominal pain, bladder incontinence, bowel incontinence, chest pain, dysuria, fever, headaches, paresis, pelvic pain, perianal numbness or weight loss. Risk factors include obesity, lack of exercise and sedentary lifestyle. He has tried home exercises and bed rest for the symptoms. The treatment provided mild relief. Neurologic Problem  The patient's primary symptoms include clumsiness, focal sensory loss, focal weakness, a loss of balance, memory loss and weakness. The patient's pertinent negatives include no altered mental status, near-syncope, slurred speech, syncope or visual change. This is a chronic problem. Episode onset: MORE    THAN   2  YEARS. The neurological problem developed gradually. The problem has been waxing and waning since onset. There was lower extremity, left-sided and right-sided focality noted. Associated symptoms include back pain and vertigo. Pertinent negatives include no abdominal pain, auditory change, aura, bladder incontinence, bowel incontinence, chest pain, confusion, diaphoresis, dizziness, fatigue, fever, headaches, light-headedness, nausea, neck pain, palpitations, shortness of breath or vomiting.  Past treatments include medication. The treatment provided no relief. His past medical history is significant for dementia. There is no history of a bleeding disorder, a clotting disorder, a CVA, head trauma, liver disease, mood changes or seizures. History obtained from  The patient     and other  available   medical  records   were  Also  reviewed. The  Duration,  Quality,  Severity,  Location,  Timing,  Context,  Modifying  Factors   Of   The   Chief   Complaint   And  Present  Illness       Was   Reviewed   In   Chronological   Manner.                                             PATIENT'S  MAIN  CONCERNS INCLUDE :                       1)   H/O   CHRONIC  LUMBAR   PAIN   AND  RADICULOPATHY                                             MORE  SO     RIGHT  SIDE                                                     -     STABLE                              2)     H/O    CHRONIC    WEAKNESS    AND  NUMBNESS   MORE  IN  THE                                         RIGHT  LOWER  EXTREMITY                                                         -     STABLE                              3)            TYPE  II  DM    SINCE     2005                                        WITH  ELEVATED    HEMOGLOBIN   A 1 C                             4)           PERIPHERAL POLYNEUROPATHY                                            MORE  SO  BOTH  LOWER  EXTREMITIES                                                      -    STABLE                                 H/O    EXPOSURE  TO  AGENT  ORANGE                            5)           H/O    MILD   BALANCE  PROBLEMS                                                  -  BETTER                           6)     PREVIOUS     H/O    RECURRENT  FALLS                                        SINCE   3     YEARS                                            NO   RECENT   FALLS                           7)        H/O    CHRONIC      MEMORY  PROBLEMS FOR     3     YEARS                                                              -    STABLE                                   NO  CORRECTABLE  ETIOLOGIES                                  PATIENT  NOT  INTERESTED    IN    MEDICATION  FOR                                         HIS   COGNITIVE  PROBLEMS. 8)     H/O   MULTIPLE  CO  MORBID  MEDICAL  CONDITIONS                                      BEING  FOLLOWED  BY  HIS    PCP                                 9)    H/O   CHRONIC    ATRIAL  FIBRILLATION                                         -   ON    ELIQUIS                                  BEING  FOLLOWED  BY   HIS   CARDIOLOGY                                10)     PREVIOUS    H/O      EVALUATIONS      AND  THERAPY                                         WITH   DR. MIGUEL                             11)        AT      RISK  FOR     STROKE,    CEREBRAL  ISCHEMIA                                              RADICULOPATHY ,    FALLING                                                  12)        HAD    NEURO  DIAGNOSTIC  EVALUATIONS  IN  SEPT. 2019                                     A) MRI  BRAIN     SHOWED  NO  ACUTE  PATHOLOGY                                      B)  CAROTID  DOPPLER     SHOWED     50 - 69  %    LEFT   STENOSIS                                                          -   NEEDS  MONITORING                                         C)     EMG    BOTH  LOWER  EXTREMITIES       SHOWED                                                             MODERATE  PERIPHERAL POLYNEUROPATHY                                                           AND  CHRONIC  LOWER  LUMBAR  RADICULOPATHY                                                              13)        PATIENT     HAD     THERAPY    IN   2020                                    AT  [facility]   IN    CONSULTATION    WITH    HIS  [facility]   PROVIDERS                                     WHICH    HELPED   HIM    AS  PER  PATIENT. 14)     CAROTID  DOPPLER  IN      NOV. 2021     SHOWED                                   NO   HEMODYNAMICALLY  SIGNIFICANT     STENOSIS                                          15)       H/O     RENAL   PROBLEMS,    LUNG PROBLEMS,     WORSENING OF                                         ATRIAL  FIBRILLATION          WITH     PROLONGED   HOSPITALIZATION      IN                                              JAN. 2022    AND   FEB. 2022          AT  Critical access hospital                                  16)       H/O     MILD     WORSENING  OF     CHRONIC  LUMBAR  PAIN     AND                                               NEUROPATHIC   SYMPTOMS. AFTER   HIS  HOSPITALIZATIONS                                                 ALSO  HAD  OUT PATIENT  REHAB     FOR  TWO  MONTHS                                                  NO   FALLING. PATIENT  USES   QUAD  CANE  AND  WALKER                                16)      PATIENT   FEELING   DECENT  RECOVERY . PATIENT  REFUSED     FOLLOW  UP  NEURO  DIAGNOSTIC                                             EVALUATIONS                                   18)     VARIOUS  RISK   FACTORS   WERE  REVIEWED   AND   DISCUSSED. PATIENT   HAS  MULTIPLE   MEDICAL, NEUROLOGICAL      PROBLEMS . PATIENT'S   MANAGEMENT  IS  CHALLENGING.                              PRECIPITATING  FACTORS: including  fever/infection, exertion/relaxation, position change, stress, weather change,                        medications/alcohol, time of day/darkness/light  Are  absent                                                              MODIFYING  FACTORS:  fever/infection, exertion/relaxation, position change, stress, weather change,                        medications/alcohol, time of day/darkness/light  Are  absent             Patient   Indicates   The  Presence   And  The  Absence Of  The  Following    Associated  And           Additional  Neurological    Symptoms:                                Balance  And coordination   problems  present           Gait problems     present            Headaches      absent              Migraines           absent           Memory problemspresent              Confusion        absent            Paresthesia   numbness          present           Seizures  And  Starring  Episodes           absent           Syncope,  Near  syncopal episodes         absent           Speech   problems           absent             Swallowing   Problems      absent            Dizziness,  Light headedness           absent              Vertigo        absent             Generalized   Weakness    absent              focal  Weakness     present             Tremors         absent              Sleep  Problems     absent             History  Of   Recent  Head  Injury     absent             History  Of   Recent  TIA     absent             History  Of   Recent    Stroke     absent             Neck  Pain   and   Neck muscle  Spasms  absent               Radiating  down   And   Weakness           absent            Lower back   Pain  And     Spasms  present              Radiating    Down   And   Weakness          present                H/OFALLS        present               History  Of   Visual  Symptoms    absent                  Associated   Diplopia       absent                                               Also   Additional   Symptoms   Present    As  Documented    In   The   detailed                  Review  Of  Systems   And    Please   Refer   To    Them for   Additional    Information. Any components  That are either  Unobtainable  Or  Limited  In   HPI, ROS  And/or PFSH   Are                   Due   ToPatient's  Medical  Problems,  Clinical  Condition   and/or lack of                                other    Alternate   resources.           RECORDS   REVIEWED:    historical medical records       INFORMATION   REVIEWED:     MEDICAL   HISTORY,SURGICAL   HISTORY,     MEDICATIONS   LIST,   ALLERGIES AND  DRUG  INTOLERANCES,       FAMILY   HISTORY,  SOCIAL  HISTORY,      PROBLEM  LIST   FOR  PATIENT  CARE   COORDINATION      Past Medical History:   Diagnosis Date    Atrial fibrillation ([facility]     Cancer of kidney ([facility]     Stage 3    CHF (congestive heart failure) ([facility] 2018    GERD (gastroesophageal reflux disease)     Hypertension     Type 2 diabetes mellitus ([facility]          Past Surgical History:   Procedure Laterality Date    APPENDECTOMY      CIRCUMCISION  2014    FOOT FRACTURE SURGERY      Right foot, Broken ankle    TONSILLECTOMY           Current Outpatient Medications   Medication Sig Dispense Refill    acetaZOLAMIDE (DIAMOX) 250 MG tablet Take 250 mg by mouth      bumetanide (BUMEX) 1 MG tablet take 2 tablets by mouth every morning then 2 tablets IN THE AFTERNOON      amLODIPine (NORVASC) 10 MG tablet Take 5 mg by mouth      carvedilol (COREG) 3.125 MG tablet take 1 tablet by mouth twice a day with food      TRELEGY ELLIPTA 200-62.5-25 MCG/INH AEPB inhale 1 puff by mouth every 24 hours      isosorbide dinitrate (ISORDIL) 10 MG tablet Take 10 mg by mouth      insulin glargine (LANTUS;BASAGLAR) 100 UNIT/ML injection pen Inject into the skin      insulin lispro, 1 Unit Dial, 100 UNIT/ML SOPN Inject into the skin      potassium chloride (KLOR-CON M) 20 MEQ extended release tablet take 1 tablet by mouth twice a day for 2 DOSES      RA SALINE NASAL SPRAY 0.65 % nasal spray instill 2 sprays into each nostril EVERY HOUR AS NEEDED      hydrALAZINE (APRESOLINE) 10 MG tablet Take 10 mg by mouth 2 times daily      levothyroxine (SYNTHROID) 50 MCG tablet Take 100 mcg by mouth every morning (before breakfast)       Cholecalciferol (VITAMIN D3) 1.25 MG (31113 UT) CAPS Take by mouth      Multiple Vitamins-Minerals (MULTIVITAMIN ADULT PO) Take by mouth      atorvastatin (LIPITOR) 10 MG tablet Take 10 mg by mouth daily Half tablet daily      apixaban (ELIQUIS) 5 MG TABS tablet Take 5 mg by mouth 2 times daily      blood glucose test strips (GMATE BLOOD GLUCOSE TEST) strip 1 each by In Vitro route daily As needed.  Lancets MISC 1 each by Does not apply route daily      Insulin Pen Needle (PEN NEEDLES) 31G X 6 MM MISC 1 each by Does not apply route daily      Insulin Pen Needle (B-D UF III MINI PEN NEEDLES) 31G X 5 MM MISC 1 each by Does not apply route daily      Liraglutide (VICTOZA SC) Inject 1.8 mg into the skin daily      nitroGLYCERIN (NITROSTAT) 0.4 MG SL tablet Dissolve one tablet under the tongue every 5 minutes as needed for chest pain up to 3 tablets, if pain still not relieved call 911 (Patient not taking: Reported on 5/12/2022)      PIOGLITAZONE HCL PO Take 1 tablet by mouth daily (Patient not taking: Reported on 5/12/2022)      amiodarone (CORDARONE) 200 MG tablet Take 200 mg by mouth 2 times daily (Patient not taking: Reported on 5/12/2022)      torsemide (DEMADEX) 20 MG tablet Take 40 mg by mouth daily      glimepiride (AMARYL) 2 MG tablet Take 2 mg by mouth every morning (before breakfast) Take half tablet to 1 tablet every morning. Hold if blood sugar going low. (Patient not taking: Reported on 5/12/2022)       No current facility-administered medications for this visit.          Allergies   Allergen Reactions    Albuterol Palpitations         Family History   Problem Relation Age of Onset    Stroke Mother     Heart Disease Father     Heart Attack Father          Social History     Socioeconomic History    Marital status:      Spouse name: Not on file    Number of children: Not on file    Years of education: Not on file    Highest education level: Not on file   Occupational History    Not on file   Tobacco Use    Smoking status: Former Smoker     Years: 10.00    Smokeless tobacco: Never Used    Tobacco comment: Age 14 to 31's Vaping Use    Vaping Use: Never used   Substance and Sexual Activity    Alcohol use: Not Currently    Drug use: Never    Sexual activity: Not on file   Other Topics Concern    Not on file   Social History Narrative    Not on file     Social Determinants of Health     Financial Resource Strain:     Difficulty of Paying Living Expenses: Not on file   Food Insecurity:     Worried About Running Out of Food in the Last Year: Not on file    Eulogio of Food in the Last Year: Not on file   Transportation Needs:     Lack of Transportation (Medical): Not on file    Lack of Transportation (Non-Medical):  Not on file   Physical Activity:     Days of Exercise per Week: Not on file    Minutes of Exercise per Session: Not on file   Stress:     Feeling of Stress : Not on file   Social Connections:     Frequency of Communication with Friends and Family: Not on file    Frequency of Social Gatherings with Friends and Family: Not on file    Attends Restorationist Services: Not on file    Active Member of [address] [place] [address] or Organizations: Not on file    Attends Club or Organization Meetings: Not on file    Marital Status: Not on file   Intimate Partner Violence:     Fear of Current or Ex-Partner: Not on file    Emotionally Abused: Not on file    Physically Abused: Not on file    Sexually Abused: Not on file   Housing Stability:     Unable to Pay for Housing in the Last Year: Not on file    Number of [place] in the Last Year: Not on file    Unstable Housing in the Last Year: Not on file       Vitals:    05/12/22 1144   BP: 134/80   Pulse: 69   Resp: 16   SpO2: 97%         Wt Readings from Last 3 Encounters:   05/12/22 255 lb (115.7 kg)   11/11/21 285 lb (129.3 kg)   05/14/21 282 lb (127.9 kg)         BP Readings from Last 3 Encounters:   05/12/22 134/80   11/11/21 138/70   05/14/21 128/72         Review of Systems   Constitutional: Negative for appetite change, chills, diaphoresis, fatigue, fever, unexpected weight change and weight loss. HENT: Negative for congestion, dental problem, drooling, ear discharge, ear pain, facial swelling, hearing loss, mouth sores, nosebleeds, postnasal drip, sinus pressure, sore throat, tinnitus, trouble swallowing and voice change. Eyes: Negative for photophobia, pain, discharge, redness, itching and visual disturbance. Respiratory: Negative for apnea, cough, choking, chest tightness, shortness of breath and wheezing. Cardiovascular: Negative for chest pain, palpitations, leg swelling and near-syncope. Gastrointestinal: Negative for abdominal distention, abdominal pain, blood in stool, bowel incontinence, constipation, diarrhea, nausea and vomiting. Endocrine: Negative for cold intolerance, heat intolerance, polydipsia, polyphagia and polyuria. Genitourinary: Negative for bladder incontinence, dysuria and pelvic pain. Musculoskeletal: Positive for back pain and gait problem. Negative for arthralgias, joint swelling, myalgias, neck pain and neck stiffness. Skin: Negative for color change, pallor, rash and wound. Allergic/Immunologic: Negative for environmental allergies, food allergies and immunocompromised state. Neurological: Positive for vertigo, focal weakness, weakness, paresthesias and loss of balance. Negative for dizziness, tremors, seizures, syncope, facial asymmetry, speech difficulty, light-headedness and headaches. Hematological: Negative for adenopathy. Does not bruise/bleed easily. Psychiatric/Behavioral: Positive for decreased concentration and memory loss. Negative for agitation, behavioral problems, confusion, dysphoric mood, hallucinations, self-injury, sleep disturbance and suicidal ideas. The patient is not nervous/anxious and is not hyperactive. OBJECTIVE:    Physical Exam  Constitutional:       Appearance: He is well-developed. HENT:      Head: Normocephalic and atraumatic. No raccoon eyes or Ritter's sign.       Right Ear: External ear normal.      Left Ear: External ear normal.      Nose: Nose normal.   Eyes:      Conjunctiva/sclera: Conjunctivae normal.   Neck:      Thyroid: No thyroid mass or thyromegaly. Vascular: No carotid bruit. Trachea: No tracheal deviation. Meningeal: Brudzinski's sign and Kernig's sign absent. Cardiovascular:      Rate and Rhythm: Normal rate and regular rhythm. Pulmonary:      Effort: Pulmonary effort is normal.   Musculoskeletal:         General: No tenderness. Cervical back: Normal range of motion and neck supple. No rigidity. No muscular tenderness. Normal range of motion. Skin:     General: Skin is warm. Coloration: Skin is not pale. Findings: No erythema or rash. Nails: There is no clubbing. Psychiatric:         Attention and Perception: He is attentive. Mood and Affect: Mood is not anxious or depressed. Affect is not labile, blunt, angry or inappropriate. Behavior: Behavior is not agitated, slowed, aggressive, withdrawn, hyperactive or combative. Behavior is cooperative. Thought Content: Thought content is not paranoid or delusional. Thought content does not include homicidal or suicidal ideation. Thought content does not include homicidal or suicidal plan. Cognition and Memory: Memory is impaired. He does not exhibit impaired recent memory or impaired remote memory. Judgment: Judgment is not impulsive or inappropriate. NEUROLOGICALEXAMINATION :      A) MENTAL STATUS:                   Alert and  oriented  To time, place  And  Person. No Aphasia. No  Dysarthria. Able   To  Follow   SIMPLE    commands   without   Any  Difficulty. No right  To left confusion. Normal  Speech  And language function.                    Insight and  Judgment ,Fund  Of  Knowledge   within normal limits                Recent  And  Remote memory  DECREASED Attention &  Concentration are    DECREASED                                                 B) CRANIAL NERVES :      CN : Visual  Acuity  And  Visual fields  within normal limits               Fundi  Could  Not  Be  Could  Not  Be  Evaluated. 3,4,6 CN : Both  Pupils are   Reactive and  Equal.  Movements  Are  Intact. No  Nystagmus. No  VASHTI. No  Afferent  Pupillary  Defect noted. 5 CN :  Normal  Facial sensations and Corneal  Reflexes           7 CN:  Normal  Facial  Symmetry  And  Strength. No facial  Weakness. 8 CN :  Hearing  Appears    DECREASED            9, 10 CN: Normal   spontaneous, reflex   palate   movements         11 CN:   Normal  Shoulder  shrug and  strength         12 CN :   Normal  Tongue movements and  Tongue  In midline                        No tongue   Fasciculations or atrophy       C) MOTOR  EXAM:                 Strength  In upper  5/5      And  Lower   extremities  3+/5   RIGHT     AND     4+/5   IN  LEFT                    Rapid   alternating  And  repetitions  Movements    DECREASED                   Muscle  Tone  In upper  And  Lower  Extremities  normal                No rigidity. No  Spasticity. Bradykinesia   absent               No  Asterixis. Sustention  Tremor , Resting   Tremor   absent                    No   other  Abnormal  Movements noted           D) SENSORY :               Light   touch, pinprick,   position  And  Vibration   IMPAIRED  IN                             GLOVE  AND  STOCKING  MANNER        E) REFLEXES:                   Deep  Tendon  Reflexes     DECREASED                    No  pathological  Reflexes  Bilaterally.                                   F) COORDINATION  AND  GAIT :                                Station and  Gait    SLOW                                Romberg 's test   POSITIVE                            Ataxia negative          ASSESSMENT:      Patient Active Problem List   Diagnosis    Type 2 diabetes mellitus (HCC)    Hypertension    CHF (congestive heart failure) (HCC)    Atrial fibrillation (HCC)    GERD (gastroesophageal reflux disease)    Chronic lumbar radiculopathy    Class 2 obesity due to excess calories without serious comorbidity with body mass index (BMI) of 35.0 to 35.9 in adult    Weakness of right lower extremity    Numbness and tingling    Polyneuropathy associated with underlying disease [facility]    H/O falling    Balance problem    Chronic cerebral ischemia    Memory problem    Bilateral carotid artery stenosis    Elevated hemoglobin A1c    Type 2 diabetes mellitus with diabetic polyneuropathy, without long-term current use of insulin ([facility]     ULTRASOUND EVALUATION OF THE CAROTID ARTERIES       11/5/2020       COMPARISON:   09/13/2019       HISTORY:   ORDERING SYSTEM PROVIDED HISTORY: Essential hypertension       Left carotid stenosis, history of falling       FINDINGS:       RIGHT:       The right common carotid artery demonstrates peak systolic velocities of 76   and 53 cm/sec in the proximal and distal segments respectively.       The right internal carotid artery demonstrates the systolic velocities of 62,   66, and 76 cm/sec in the proximal, mid and distal segments respectively.       The external carotid artery is patent.  The vertebral artery demonstrates   normal antegrade flow.       No evidence of focal atherosclerotic plaque.       ICA/CCA ratio of 1.1.           LEFT:       The left common carotid artery demonstrates peak systolic velocities of 71   and 63 cm/sec in the proximal and distal segments respectively.       The left internal carotid artery demonstrates the systolic velocities of 69,   68, and 83 cm/sec in the proximal, mid and distal segments respectively.       The external carotid artery is patent.  The vertebral artery demonstrates   normal antegrade flow.       Mild hard plaque in the proximal internal carotid artery with measured   stenosis of up to 21%.       ICA/CCA ratio of 1.3.           Impression   The right internal carotid artery demonstrates 0-50% stenosis .       The left internal carotid artery demonstrates 0-50% stenosis .       Bilateral vertebral arteries are patent with flow in the normal direction.               MRI OF THE BRAIN WITHOUT CONTRAST  9/13/2019 10:50 am       TECHNIQUE:   Multiplanar multisequence MRI of the brain was performed without the   administration of intravenous contrast.       COMPARISON:   None.       HISTORY:   ORDERING SYSTEM PROVIDED HISTORY: H/O falling   TECHNOLOGIST PROVIDED HISTORY:   Reason for Exam:  Loss of feeling in right leg for one to two years. Acuity: Chronic   Type of Exam: Unknown   Additional signs and symptoms: H/O falling; Balance problem; Chronic cerebral   ischemia; Memory problem.       FINDINGS:   INTRACRANIAL STRUCTURES/VENTRICLES: The sellar and suprasellar structures,   optic chiasm, corpus callosum, pineal gland, tectum, and midline brainstem   structures are unremarkable.  The craniocervical junction is unremarkable. There is no acute intracranial hemorrhage, mass effect, or midline shift. There is satisfactory overall gray-white matter differentiation.  The   ventricular structures are symmetric and unremarkable.  The infratentorial   structures including the cerebellopontine angles and internal auditory canals   are unremarkable. There is no abnormal restricted diffusion.  There is no   abnormal blooming artifact on susceptibility weighted imaging.       ORBITS: The visualized portion of the orbits demonstrate no acute abnormality.       SINUSES: The visualized paranasal sinuses and mastoid air cells are well   aerated.       BONES/SOFT TISSUES: The bone marrow signal intensity appears normal. The soft   tissues demonstrate no acute abnormality.           Impression   Unremarkable MRI of the brain.             ULTRASOUND EVALUATION OF THE CAROTID ARTERIES       9/13/2019       COMPARISON:   None       HISTORY:   ORDERING SYSTEM PROVIDED HISTORY: H/O falling   TECHNOLOGIST PROVIDED HISTORY:   Reason for Exam: memory problems   Acuity: Acute   Type of Exam: Initial   Relevant Medical/Surgical History: HTN, HLD and diabetes       FINDINGS:       RIGHT:       The right common carotid artery demonstrates peak systolic velocities of 66   and 55 cm/sec in the proximal and distal segments respectively.       The right internal carotid artery demonstrates the systolic velocities of 32,   51, and 58 cm/sec in the proximal, mid and distal segments respectively.       The external carotid artery is patent.  The vertebral artery demonstrates   normal antegrade flow.       Atherosclerotic disease is present at the bifurcation.       ICA/CCA ratio of 0.6.           LEFT:       The left common carotid artery demonstrates peak systolic velocities of 87,   and 68 cm/sec in the proximal and distal segments respectively.       The left internal carotid artery demonstrates the systolic velocities of 50,   97, and 78 cm/sec in the proximal, mid and distal segments respectively.       The external carotid artery is patent.  The vertebral artery demonstrates   normal antegrade flow.       Atherosclerotic disease is present at the bifurcation.       There is approximately 52% stenosis of left proximal internal carotid artery   secondary to irregular plaque.  This is according to diameter measurement.       ICA/CCA ratio of 0.6.           Impression   1. The left internal carotid artery demonstrates 50-69% stenosis by diameter   measurement.       2. The right internal carotid artery demonstrates 0-50% stenosis.       3. Bilateral vertebral arteries are patent with flow in the normal direction.       4. Atherosclerotic disease at the bilateral carotid bifurcation.       RECOMMENDATIONS:   Follow-up CTA neck. VISITING DIAGNOSIS:          ICD-10-CM    1.  Diabetic peripheral neuropathy associated with type 2 diabetes mellitus (HCC)  E11.42    2. Chronic lumbar radiculopathy  M54.16    3. Weakness of right lower extremity  R29.898    4. Bilateral carotid artery stenosis  I65.23    5. Elevated hemoglobin A1c  R73.09    6. Polyneuropathy associated with underlying disease ([facility]  G63    7. Numbness and tingling  R20.0     R20.2    8. Balance problem  R26.89    9. H/O falling  Z91.81    10. Memory problem  R41.3    11. Chronic cerebral ischemia  I67.82    12. Atrial fibrillation, unspecified type ([facility]  I48.91    13. Class 2 obesity due to excess calories without serious comorbidity with body mass index (BMI) of 35.0 to 35.9 in adult  E66.09     Z68.35                   CONCERNS   &   INCREASED   RISK   FOR        * TIA,  CEREBRO  VASCULAR  ISCHEMIA,STROKE      *   COGNITIVE  &   MEMORY PROBLEMS  AND  DEMENTIAS       *  MONONEUROPATHIES,   RADICULOPATHY       *   PERIPHERAL  NEUROPATHY,   NEUROPATHIC  PAIN      *  GAIT  DIFFICULTIES  &   BALANCE PROBLMES   AND  FALL                VARIOUS  RISK   FACTORS   WERE  REVIEWED   AND   DISCUSSED. *  PATIENT   HAS  MULTIPLE   MEDICAL, NEUROLOGICAL      PROBLEMS . PATIENT'S   MANAGEMENT  IS  CHALLENGING. PLAN:                         Beverly Wallace  Of  The  Diagnoses,  The  Management & Treatment  Options            AND    Care  plan  Were          Reviewed and   Discussed   With  patient. * Goals  And  Expectations  Of  The  Therapy  Discussed   And  Reviewed. *   Benefits   And   Side  Effect  Profile  Of  Medication/s   Were   Discussed                * Need   For  Further   Follow up For  The  Various  Problems Were  discussed.           * Results  Of  The  Previous  Diagnostic tests were reviewed and  discussed                 Medical  Decision  Making  Was  Made  Based on the   Complexity  Of  Above  Mentioned  Diagnoses,    Data reviewed             And    Risk  Of  Significant   Co morbidities and complicating   Factors. Medical  Decision  Was   High    Complexity   Due   To  The  Patient's  Multiple  Symptoms,      Advancing   Disease,  Complex  Treatment  Regimen,  Multiple medications           and   Risk  Of   Side  Effects,  Difficulty  In  Medication  Management  And  Diagnostic  Challenges       In  View  Of  The  Associated   Co  Morbid  Conditions   And  Problems. * FALL   PRECAUTIONS. THESE  REVIEWED   AND  DISCUSSED    *  USE   WALKING  ASSISTANCE  DEVICES     QUAD  CANE          *   ABSOLUTELY   NO  DRIVING      *   BE  CAREFUL  WITH  ACTIVITIES  . *   AVOID   NECK  AND/ BACK  STRAINING  ACTIVITIES      *   TO   WEAR   BILATERAL   WRIST  BRACES       *   TO  AVOID  PRESSURE  OVER   ULNAR  ASPECT   OF   ELBOWS        *   ADEQUATE   FLUIDINTAKE   AND  ELECTROLYTE  BALANCE             * KEEP  DAIRY  OF   THE  NEUROLOGICAL  SYMPTOMS        RECORDING THE    DURATION  AND  FREQUENCY. *  AVOID    CONDITIONS  AND  FACTORS   THAT  MAKE                  NEUROLOGICAL  SYMPTOMS  WORSE.                    *TO  MAINTAIN  REGULAR  SLEEP  WAKE  CYCLES. *   TO  HAVE  ADEQUATE  REST  AND   SLEEP    HOURS.          *    TO   AVOID   TO  SLEEP  IN   SUPINE  POSITION. *      WEIGHT   LOSS. *    AVOID  ANY USAGE OF    TOBACCO,              EXCESSIVE  ALCOHOL  AND   ILLEGAL   SUBSTANCES        *   Compliance   With  Medications   And  Instructions        * CURRENTLY    TOLERATING  THE  PRESCRIBED   MEDICATIONS. WITHOUT  ANY  SIGNIFICANT  SIDE  EFFECTS   &  GETTING BENEFIT. *    Prophylactic  Use   Of     Vitamin   B   Complex,  Folic  Acid,    Vitamin  B12    Multivitamin,       Calcium  With  magnesium  And  Vit D    Supplementations   Over  The  Counter  Discussed         *FOOT  CARE, DAILY  INSPECTION  OF  FEET   AND         PERIODIC  PODIATRY EVALUATIONS .             *  PATIENT  IS  ALSO   COUNSELED   TO  KEEP ACTIVITIES:         A)   SIMPLE      B)  ORGANIZED      C)  WRITEDOWN                       *  EVALUATIONS  AND  FOLLOW UP:                 * PHYSICAL  THERAPY                                 *CARDIOLOGY              *   OPTHALMOLOGY                                                    *       H/O   CHRONIC       ATRIAL  FIBRILLATION                                         -   ON    ELIQUIS                                  BEING  FOLLOWED  BY   [facility]   [facility]                      *PATIENT   TO  FOLLOW  UP  WITH   PRIMARY  CARE         OTHER  CONSULTANTS  AS  BEFORE. *  Maintain   Healthy  Life Style    With   Periodic  Monitoring  Of      Any  Medical  Conditions  Including   Elevated  Blood  Pressure,  Lipid  Profile,     Blood  Sugar levels  AndHeart  Disease. *   Period   Screening  For  Cancers  Involving  Breast,  Colon,    lungs  And  Other  Organs  As  Applicable,  In consultation   With  Your  Primary Care Providers. *Second  Neurological  Opinion  And  Evaluations  In  [facility]  Setting  If  Patient  Is  Interested. * Please   Contact   Neurology  Clinic   Early   If   Are  Any  New  Neurological   And  Any neurological  Concerns. *  If  The  Patient remains  Neurologically  Stable   Return   To  [facility] Neurology Department   IN     3 -  6         MONTHS  TIME   FOR  FURTHER              FOLLOW UP.                           *   The  Neurological   Findings,  Possible  Diagnosis,  Differential diagnoses                    And  Options  For    Further   Investigations                   And  management   Are  Discussed  Comprehensively. Medications   And  Prescription   Risks  And  Side effects  Are   Also  Discussed.                      *  If   There is  Any  Significant  Worsening   Of  Current  Symptoms  And  Or                  If patient  Develops   Any additional  New NeurologicalSymptoms                  Or  Significant  Concerns   Should  Call  911 or  Go  To  Emergency  Department                     For  Further  Immediate  Evaluation. The   Above  Were  Reviewed  With  patient   and                       questions  Answered  In  Detail. More   Than  50% of face  To face Time   Was  Spent  On  Counseling                    And   Coordination  Of  Care   Of   Patient's  multiple   Neurological  Problems                         And   Comorbid  Medical   Conditions. Electronically signed by Mitzi Burkett MD.,   [facility]      Board Certified in  Neurology &  In  [facility] ([facility])      DISCLAIMER:   Although every effort was made to ensure the accuracy of this  electronictranscription, some errors in transcription may have occurred. GENERAL PATIENT INSTRUCTIONS:      A Healthy Lifestyle: Care Instructions   Your Care Instructions   A healthy lifestyle can help you feel good, stay at ahealthy weight, and have plenty of energy for both work and play. A healthy lifestyle is something you can share with your whole family.  A healthy lifestyle also can lower your risk for serious health problems, such ashigh blood pressure, heart disease, and diabetes.  You can follow a few steps listed below to improve your health and the health of your family.  Follow-up careis a key part of your treatment and safety. Be sure to make and go to all appointments, and call your doctor if you are having problems. Its also a good idea to know your test results and keep a list of the medicines you take.  How can you care for yourself at home?  Do not eat too much sugar, fat, or fast foods. You can still have dessert and treats nowand then. The goal is moderation.  Start small to improve your eating habits.  Pay attention to portion sizes, drink less juice and soda pop, and eat more fruits and vegetables.  Eat a healthy amount of food. A 3-ounce serving of meat, for example, is about the size of a deck of cards. Fill the rest of your plate with vegetables and whole grains.  Limit theamount of soda and sports drinks you have every day. Drink more water when you are thirsty.  Eat at least 5 servings of fruits and vegetables every day. It may seem like a lot, but it is not hard to reach this goal. Aserving or helping is 1 piece of fruit, 1 cup of vegetables, or 2 cups of leafy, raw vegetables. Have an apple or some carrot sticks as an afternoon snack instead of a candy bar. Try to have fruits and/or vegetables at everymeal.   Make exercise part of your daily routine. You may want to start with simple activities, such as walking, bicycling, or slow swimming. Try aislinn active 30 to 60 minutes every day. You do not need to do all 30 to 60 minutes all at once. For example, you can exercise 3 times a day for 10 or 20 minutes. Moderate exercise is safe for most people, but it is always agood idea to talk to your doctor before starting an exercise program.   Keep moving. Arvil Paddy the lawn, work in the garden, or [facility] [employer]. Take the stairs instead of the elevator at work.  If you smoke, quit. Peoplewho smoke have an increased risk for heart attack, stroke, cancer, and other lung illnesses. Quitting is hard, but there are ways to boost your chance of quitting tobacco for good.  Use nicotine gum, patches, or lozenges.  Ask your doctor about stop-smoking programs and medicines.  Keep trying.  In addition to reducing your risk of diseases in the future, you will notice some benefits soon after you stop using tobacco. If you have shortness of breath or asthma symptoms, they will likely getbetter within a few weeks after you quit.  Limit how much alcohol you drink. Moderate amounts of alcohol (up to 2 drinks a day for men, 1drink a day for women) are okay.  But drinking too much can lead to liver problems, high blood pressure, and other health problems.  health   If you have a family, there are many things you can do together to improve your health.  Eat meals together as a family as often as possible.  Eat healthy foods. This includes fruits, vegetables, lean meats and dairy, and whole grains.  Include your family in your fitness plan. Most peoplethink of activities such as jogging or tennis as the way to fitness, but there are many ways you and your family can be more active. Anything that makes you breathe hard and gets your heart pumping is exercise. Here are sometips:   Walk to do errands or to take your child to school or the bus.  Go for a family bike ride after dinner instead of watching TV.  Where can you learn more?  Go toFondutps://[employer]. org and sign in to your [employer] account. Enter R000 in the Search HealthInformation box to learn more about \"A Healthy Lifestyle: Care Instructions. \"     If you do not have anaccount, please click on the \"Sign Up Now\" link.  Current as of: July 26, 2016   Content Version: 11.2   © 6978-2563 [employer]. Care instructions adapted under license by [facility] ([facility]). If you have questions about a medical condition or this instruction, always ask your healthcare professional. Dr. Tariff disclaims any warranty or liability for your use of this information.

## 2022-11-17 ENCOUNTER — OFFICE VISIT (OUTPATIENT)
Dept: NEUROLOGY | Age: 77
End: 2022-11-17
Payer: MEDICARE

## 2022-11-17 VITALS
OXYGEN SATURATION: 98 % | DIASTOLIC BLOOD PRESSURE: 78 MMHG | SYSTOLIC BLOOD PRESSURE: 128 MMHG | WEIGHT: 240 LBS | BODY MASS INDEX: 36.49 KG/M2 | HEART RATE: 64 BPM | RESPIRATION RATE: 16 BRPM

## 2022-11-17 DIAGNOSIS — G63 POLYNEUROPATHY ASSOCIATED WITH UNDERLYING DISEASE (HCC): Primary | ICD-10-CM

## 2022-11-17 DIAGNOSIS — R41.3 MEMORY PROBLEM: ICD-10-CM

## 2022-11-17 DIAGNOSIS — R20.2 NUMBNESS AND TINGLING: ICD-10-CM

## 2022-11-17 DIAGNOSIS — R29.898 WEAKNESS OF RIGHT LOWER EXTREMITY: ICD-10-CM

## 2022-11-17 DIAGNOSIS — R73.09 ELEVATED HEMOGLOBIN A1C: ICD-10-CM

## 2022-11-17 DIAGNOSIS — I67.82 CHRONIC CEREBRAL ISCHEMIA: ICD-10-CM

## 2022-11-17 DIAGNOSIS — Z91.81 H/O FALLING: ICD-10-CM

## 2022-11-17 DIAGNOSIS — I65.22 STENOSIS OF LEFT CAROTID ARTERY: ICD-10-CM

## 2022-11-17 DIAGNOSIS — R20.0 NUMBNESS AND TINGLING: ICD-10-CM

## 2022-11-17 DIAGNOSIS — I48.91 ATRIAL FIBRILLATION, UNSPECIFIED TYPE (HCC): ICD-10-CM

## 2022-11-17 DIAGNOSIS — E11.42 TYPE 2 DIABETES MELLITUS WITH DIABETIC POLYNEUROPATHY, WITHOUT LONG-TERM CURRENT USE OF INSULIN (HCC): ICD-10-CM

## 2022-11-17 DIAGNOSIS — M54.16 CHRONIC LUMBAR RADICULOPATHY: ICD-10-CM

## 2022-11-17 DIAGNOSIS — R26.89 BALANCE PROBLEM: ICD-10-CM

## 2022-11-17 DIAGNOSIS — I65.23 BILATERAL CAROTID ARTERY STENOSIS: ICD-10-CM

## 2022-11-17 DIAGNOSIS — E66.09 CLASS 2 OBESITY DUE TO EXCESS CALORIES WITHOUT SERIOUS COMORBIDITY WITH BODY MASS INDEX (BMI) OF 35.0 TO 35.9 IN ADULT: ICD-10-CM

## 2022-11-17 PROCEDURE — 1123F ACP DISCUSS/DSCN MKR DOCD: CPT | Performed by: PSYCHIATRY & NEUROLOGY

## 2022-11-17 PROCEDURE — 3078F DIAST BP <80 MM HG: CPT | Performed by: PSYCHIATRY & NEUROLOGY

## 2022-11-17 PROCEDURE — 99214 OFFICE O/P EST MOD 30 MIN: CPT | Performed by: PSYCHIATRY & NEUROLOGY

## 2022-11-17 PROCEDURE — 3074F SYST BP LT 130 MM HG: CPT | Performed by: PSYCHIATRY & NEUROLOGY

## 2022-11-17 RX ORDER — POTASSIUM CHLORIDE 1.5 G/1.77G
POWDER, FOR SOLUTION ORAL
COMMUNITY
Start: 2022-10-24

## 2022-11-17 ASSESSMENT — ENCOUNTER SYMPTOMS
SINUS PRESSURE: 0
EYE ITCHING: 0
APNEA: 0
BLOOD IN STOOL: 0
CHOKING: 0
SORE THROAT: 0
WHEEZING: 0
DIARRHEA: 0
ABDOMINAL PAIN: 0
CHEST TIGHTNESS: 0
VOMITING: 0
BOWEL INCONTINENCE: 0
EYE PAIN: 0
COLOR CHANGE: 0
BACK PAIN: 1
COUGH: 0
EYE REDNESS: 0
EYE DISCHARGE: 0
SHORTNESS OF BREATH: 0
PHOTOPHOBIA: 0
VISUAL CHANGE: 0
VOICE CHANGE: 0
NAUSEA: 0
FACIAL SWELLING: 0
TROUBLE SWALLOWING: 0
ABDOMINAL DISTENTION: 0
CONSTIPATION: 0

## 2022-11-17 ASSESSMENT — PATIENT HEALTH QUESTIONNAIRE - PHQ9
SUM OF ALL RESPONSES TO PHQ QUESTIONS 1-9: 0
1. LITTLE INTEREST OR PLEASURE IN DOING THINGS: 0
SUM OF ALL RESPONSES TO PHQ QUESTIONS 1-9: 0
2. FEELING DOWN, DEPRESSED OR HOPELESS: 0
SUM OF ALL RESPONSES TO PHQ9 QUESTIONS 1 & 2: 0

## 2022-11-17 NOTE — PROGRESS NOTES
[facility] [place]  Neurology  [address]XR:[phone]   Fax: [phone]      SUBJECTIVE:       PATIENT ID:  Louise Rojas is a  RIGHT     HANDED 78 y.o. male. Back Pain  This is a chronic problem. The current episode started more than 1 year ago. The problem occurs intermittently. The problem has been gradually worsening since onset. The pain is present in the lumbar spine. The quality of the pain is described as aching and cramping. The pain does not radiate. The pain is at a severity of 3/10. The pain is mild. The pain is Worse during the day. The symptoms are aggravated by standing and sitting. Stiffness is present All day. Associated symptoms include paresthesias and weakness. Pertinent negatives include no abdominal pain, bladder incontinence, bowel incontinence, chest pain, dysuria, fever, headaches, paresis, pelvic pain, perianal numbness or weight loss. Risk factors include obesity, lack of exercise and sedentary lifestyle. He has tried home exercises and bed rest for the symptoms. The treatment provided mild relief. Neurologic Problem  The patient's primary symptoms include clumsiness, focal sensory loss, focal weakness, a loss of balance, memory loss and weakness. The patient's pertinent negatives include no altered mental status, near-syncope, slurred speech, syncope or visual change. This is a chronic problem. Episode onset: MORE    THAN   2  YEARS. The neurological problem developed gradually. The problem has been waxing and waning since onset. There was lower extremity, left-sided and right-sided focality noted. Associated symptoms include back pain and vertigo. Pertinent negatives include no abdominal pain, auditory change, aura, bladder incontinence, bowel incontinence, chest pain, confusion, diaphoresis, dizziness, fatigue, fever, headaches, light-headedness, nausea, neck pain, palpitations, shortness of breath or vomiting.  Past FOR     3     YEARS                                                              -    STABLE                                   NO  CORRECTABLE  ETIOLOGIES                                  PATIENT  NOT  INTERESTED    IN    MEDICATION  FOR                                         HIS   COGNITIVE  PROBLEMS. 8)     H/O   MULTIPLE  CO  MORBID  MEDICAL  CONDITIONS                                      BEING  FOLLOWED  BY  HIS    PCP                                 9)    H/O   CHRONIC    ATRIAL  FIBRILLATION                                         -   ON    ELIQUIS                                  BEING  FOLLOWED  BY   HIS   CARDIOLOGY                                10)     PREVIOUS    H/O      EVALUATIONS      AND  THERAPY                                         WITH   DR. MIGUEL                             11)        AT      RISK  FOR     STROKE,    CEREBRAL  ISCHEMIA                                              RADICULOPATHY ,    FALLING                                                  12)        HAD    NEURO  DIAGNOSTIC  EVALUATIONS  IN  SEPT. 2019                                     A) MRI  BRAIN     SHOWED  NO  ACUTE  PATHOLOGY                                      B)  CAROTID  DOPPLER     SHOWED     50 - 69  %    LEFT   STENOSIS                                                          -   NEEDS  MONITORING                                         C)     EMG    BOTH  LOWER  EXTREMITIES       SHOWED                                                             MODERATE  PERIPHERAL POLYNEUROPATHY                                                           AND  CHRONIC  LOWER  LUMBAR  RADICULOPATHY                                                              13)     PREVIOUS     H/O      THERAPY    IN   2020                                    AT  [facility]   IN    CONSULTATION    WITH    HIS  [facility]   PROVIDERS                                     WHICH    HELPED   HIM    AS  PER  PATIENT. 14)     CAROTID  DOPPLER  IN      NOV. 2021     SHOWED                                   NO   HEMODYNAMICALLY  SIGNIFICANT     STENOSIS                                          15)       H/O     RENAL   PROBLEMS,    LUNG PROBLEMS,     WORSENING OF                                         ATRIAL  FIBRILLATION          WITH     PROLONGED   HOSPITALIZATION      IN                                              JAN. 2022    AND   FEB. 2022          AT  [facility]                                  16)       H/O     MILD     WORSENING  OF     CHRONIC  LUMBAR  PAIN     AND                                               NEUROPATHIC   SYMPTOMS. AFTER   HIS  HOSPITALIZATIONS                                            HAD  OUT PATIENT  REHAB     FOR  TWO  MONTHS                                                  NO   FALLING. 16)      PATIENT   FEELING   DECENT  RECOVERY . PATIENT  REFUSED     FOLLOW  UP  NEURO  DIAGNOSTIC                                             EVALUATIONS                                    PATIENT   DENIES      ANY    NEW  NEUROLOGICAL   CONCERNS. 18)     VARIOUS  RISK   FACTORS   WERE  REVIEWED   AND   DISCUSSED. PATIENT   HAS  MULTIPLE   MEDICAL, NEUROLOGICAL      PROBLEMS . PATIENT'S   MANAGEMENT  IS  CHALLENGING.                              PRECIPITATING  FACTORS: including  fever/infection, exertion/relaxation, position change, stress, weather change,                        medications/alcohol, time of day/darkness/light  Are  absent                                                              MODIFYING  FACTORS:  fever/infection, exertion/relaxation, position change, stress, weather change,                        medications/alcohol, time of day/darkness/light  Are  absent             Patient Indicates   The  Presence   And  The  Absence  Of  The  Following    Associated  And           Additional  Neurological    Symptoms:                                Balance  And coordination   problems  present           Gait problems     present            Headaches      absent              Migraines           absent           Memory problemspresent              Confusion        absent            Paresthesia   numbness          present           Seizures  And  Starring  Episodes           absent           Syncope,  Near  syncopal episodes         absent           Speech   problems           absent             Swallowing   Problems      absent            Dizziness,  Light headedness           absent              Vertigo        absent             Generalized   Weakness    absent              focal  Weakness     present             Tremors         absent              Sleep  Problems     absent             History  Of   Recent  Head  Injury     absent             History  Of   Recent  TIA     absent             History  Of   Recent    Stroke     absent             Neck  Pain   and   Neck muscle  Spasms  absent               Radiating  down   And   Weakness           absent            Lower back   Pain  And     Spasms  present              Radiating    Down   And   Weakness          present                H/OFALLS        present               History  Of   Visual  Symptoms    absent                  Associated   Diplopia       absent                                               Also   Additional   Symptoms   Present    As  Documented    In   The   detailed                  Review  Of  Systems   And    Please   Refer   To    Them for   Additional    Information.                   Any components  That are either  Unobtainable  Or  Limited  In   HPI, ROS  And/or PFSH   Are                   Due   ToPatient's  Medical  Problems,  Clinical  Condition   and/or lack of                                other    Alternate resources.           RECORDS   REVIEWED:    historical medical records       INFORMATION   REVIEWED:     MEDICAL   HISTORY,SURGICAL   HISTORY,     MEDICATIONS   LIST,   ALLERGIES AND  DRUG  INTOLERANCES,       FAMILY   HISTORY,  SOCIAL  HISTORY,      PROBLEM  LIST   FOR  PATIENT  CARE   COORDINATION      Past Medical History:   Diagnosis Date    Atrial fibrillation ([facility]     Cancer of kidney ([facility]     Stage 3    CHF (congestive heart failure) ([facility] 2018    GERD (gastroesophageal reflux disease)     Hypertension     Type 2 diabetes mellitus ([facility]          Past Surgical History:   Procedure Laterality Date    APPENDECTOMY      CIRCUMCISION  2014    FOOT FRACTURE SURGERY      Right foot, Broken ankle    TONSILLECTOMY           Current Outpatient Medications   Medication Sig Dispense Refill    potassium chloride (KLOR-CON) 20 MEQ packet dissolve 2 packets in 4 ounce(s) OF WATER AND DRINK DAILY      acetaZOLAMIDE (DIAMOX) 250 MG tablet Take 250 mg by mouth      bumetanide (BUMEX) 1 MG tablet take 2 tablets by mouth every morning then 2 tablets IN THE AFTERNOON      amLODIPine (NORVASC) 10 MG tablet Take 5 mg by mouth      carvedilol (COREG) 3.125 MG tablet take 1 tablet by mouth twice a day with food      TRELEGY ELLIPTA 200-62.5-25 MCG/INH AEPB inhale 1 puff by mouth every 24 hours      isosorbide dinitrate (ISORDIL) 10 MG tablet Take 10 mg by mouth      insulin glargine (LANTUS;BASAGLAR) 100 UNIT/ML injection pen Inject into the skin      insulin lispro, 1 Unit Dial, 100 UNIT/ML SOPN Inject into the skin      RA SALINE NASAL SPRAY 0.65 % nasal spray instill 2 sprays into each nostril EVERY HOUR AS NEEDED      levothyroxine (SYNTHROID) 50 MCG tablet Take 100 mcg by mouth every morning (before breakfast)       Cholecalciferol (VITAMIN D3) 1.25 MG (01877 UT) CAPS Take by mouth      Multiple Vitamins-Minerals (MULTIVITAMIN ADULT PO) Take by mouth      atorvastatin (LIPITOR) 10 MG tablet Take 10 mg by mouth daily Half tablet daily      apixaban (ELIQUIS) 5 MG TABS tablet Take 5 mg by mouth 2 times daily      blood glucose test strips (ASCENSIA AUTODISC VI;ONE TOUCH ULTRA TEST VI) strip 1 each by In Vitro route daily As needed. Lancets MISC 1 each by Does not apply route daily      Insulin Pen Needle (PEN NEEDLES) 31G X 6 MM MISC 1 each by Does not apply route daily      Liraglutide (VICTOZA SC) Inject 1.8 mg into the skin daily      potassium chloride (KLOR-CON M) 20 MEQ extended release tablet take 1 tablet by mouth twice a day for 2 DOSES (Patient not taking: Reported on 11/17/2022)      nitroGLYCERIN (NITROSTAT) 0.4 MG SL tablet Dissolve one tablet under the tongue every 5 minutes as needed for chest pain up to 3 tablets, if pain still not relieved call 911 (Patient not taking: Reported on 5/12/2022)      hydrALAZINE (APRESOLINE) 10 MG tablet Take 10 mg by mouth 2 times daily      PIOGLITAZONE HCL PO Take 1 tablet by mouth daily (Patient not taking: Reported on 5/12/2022)      amiodarone (CORDARONE) 200 MG tablet Take 200 mg by mouth 2 times daily (Patient not taking: Reported on 5/12/2022)      torsemide (DEMADEX) 20 MG tablet Take 40 mg by mouth daily      Insulin Pen Needle (B-D UF III MINI PEN NEEDLES) 31G X 5 MM MISC 1 each by Does not apply route daily      glimepiride (AMARYL) 2 MG tablet Take 2 mg by mouth every morning (before breakfast) Take half tablet to 1 tablet every morning. Hold if blood sugar going low. (Patient not taking: Reported on 5/12/2022)       No current facility-administered medications for this visit.          Allergies   Allergen Reactions    Albuterol Palpitations         Family History   Problem Relation Age of Onset    Stroke Mother     Heart Disease Father     Heart Attack Father          Social History     Socioeconomic History    Marital status:      Spouse name: Not on file    Number of children: Not on file    Years of education: Not on file    Highest education level: Not on file Occupational History    Not on file   Tobacco Use    Smoking status: Former     Years: 10.00     Types: Cigarettes    Smokeless tobacco: Never    Tobacco comments:     Age 14 to 31's   Vaping Use    Vaping Use: Never used   Substance and Sexual Activity    Alcohol use: Not Currently    Drug use: Never    Sexual activity: Not on file   Other Topics Concern    Not on file   Social History Narrative    Not on file     Social Determinants of Health     Financial Resource Strain: Not on file   Food Insecurity: Not on file   Transportation Needs: Not on file   Physical Activity: Not on file   Stress: Not on file   Social Connections: Not on file   Intimate Partner Violence: Not on file   Housing Stability: Not on file       Vitals:    11/17/22 1141   BP: 128/78   Pulse: 64   Resp: 16   SpO2: 98%           Wt Readings from Last 3 Encounters:   11/17/22 240 lb (108.9 kg)   05/12/22 255 lb (115.7 kg)   11/11/21 285 lb (129.3 kg)         BP Readings from Last 3 Encounters:   11/17/22 128/78   05/12/22 134/80   11/11/21 138/70         Review of Systems   Constitutional:  Negative for appetite change, chills, diaphoresis, fatigue, fever, unexpected weight change and weight loss. HENT:  Negative for congestion, dental problem, drooling, ear discharge, ear pain, facial swelling, hearing loss, mouth sores, nosebleeds, postnasal drip, sinus pressure, sore throat, tinnitus, trouble swallowing and voice change. Eyes:  Negative for photophobia, pain, discharge, redness, itching and visual disturbance. Respiratory:  Negative for apnea, cough, choking, chest tightness, shortness of breath and wheezing. Cardiovascular:  Negative for chest pain, palpitations, leg swelling and near-syncope. Gastrointestinal:  Negative for abdominal distention, abdominal pain, blood in stool, bowel incontinence, constipation, diarrhea, nausea and vomiting.    Endocrine: Negative for cold intolerance, heat intolerance, polydipsia, polyphagia and polyuria. Genitourinary:  Negative for bladder incontinence, dysuria and pelvic pain. Musculoskeletal:  Positive for back pain and gait problem. Negative for arthralgias, joint swelling, myalgias, neck pain and neck stiffness. Skin:  Negative for color change, pallor, rash and wound. Allergic/Immunologic: Negative for environmental allergies, food allergies and immunocompromised state. Neurological:  Positive for vertigo, focal weakness, weakness, paresthesias and loss of balance. Negative for dizziness, tremors, seizures, syncope, facial asymmetry, speech difficulty, light-headedness and headaches. Hematological:  Negative for adenopathy. Does not bruise/bleed easily. Psychiatric/Behavioral:  Positive for decreased concentration and memory loss. Negative for agitation, behavioral problems, confusion, dysphoric mood, hallucinations, self-injury, sleep disturbance and suicidal ideas. The patient is not nervous/anxious and is not hyperactive. OBJECTIVE:    Physical Exam  Constitutional:       Appearance: He is well-developed. HENT:      Head: Normocephalic and atraumatic. No raccoon eyes or Ritter's sign. Right Ear: External ear normal.      Left Ear: External ear normal.      Nose: Nose normal.   Eyes:      Conjunctiva/sclera: Conjunctivae normal.   Neck:      Thyroid: No thyroid mass or thyromegaly. Vascular: No carotid bruit. Trachea: No tracheal deviation. Meningeal: Brudzinski's sign and Kernig's sign absent. Cardiovascular:      Rate and Rhythm: Normal rate and regular rhythm. Pulmonary:      Effort: Pulmonary effort is normal.   Musculoskeletal:         General: No tenderness. Cervical back: Normal range of motion and neck supple. No rigidity. No muscular tenderness. Normal range of motion. Skin:     General: Skin is warm. Coloration: Skin is not pale. Findings: No erythema or rash. Nails: There is no clubbing.    Psychiatric:         Attention and Perception: He is attentive. Mood and Affect: Mood is not anxious or depressed. Affect is not labile, blunt, angry or inappropriate. Behavior: Behavior is not agitated, slowed, aggressive, withdrawn, hyperactive or combative. Behavior is cooperative. Thought Content: Thought content is not paranoid or delusional. Thought content does not include homicidal or suicidal ideation. Thought content does not include homicidal or suicidal plan. Cognition and Memory: Memory is impaired. He does not exhibit impaired recent memory or impaired remote memory. Judgment: Judgment is not impulsive or inappropriate. NEUROLOGICALEXAMINATION :      A) MENTAL STATUS:                   Alert and  oriented  To time, place  And  Person. No Aphasia. No  Dysarthria. Able   To  Follow   SIMPLE    commands   without   Any  Difficulty. No right  To left confusion. Normal  Speech  And language function. Insight and  Judgment ,Fund  Of  Knowledge   within normal limits                Recent  And  Remote memory  DECREASED                Attention &  Concentration are    DECREASED                                                 B) CRANIAL NERVES :      CN : Visual  Acuity  And  Visual fields  within normal limits               Fundi  Could  Not  Be  Could  Not  Be  Evaluated. 3,4,6 CN : Both  Pupils are   Reactive and  Equal.  Movements  Are  Intact. No  Nystagmus. No  VASHTI. No  Afferent  Pupillary  Defect noted. 5 CN :  Normal  Facial sensations and Corneal  Reflexes           7 CN:  Normal  Facial  Symmetry  And  Strength. No facial  Weakness.            8 CN :  Hearing  Appears    DECREASED            9, 10 CN: Normal   spontaneous, reflex   palate   movements         11 CN:   Normal  Shoulder  shrug and  strength         12 CN :   Normal  Tongue movements and  Tongue  In midline                        No tongue   Fasciculations or atrophy       C) MOTOR  EXAM:                 Strength  In upper  5/5      And  Lower   extremities  3+/5   RIGHT     AND     4+/5   IN  LEFT                    Rapid   alternating  And  repetitions  Movements    DECREASED                   Muscle  Tone  In upper  And  Lower  Extremities  normal                No rigidity. No  Spasticity. Bradykinesia   absent               No  Asterixis. Sustention  Tremor , Resting   Tremor   absent                    No   other  Abnormal  Movements noted           D) SENSORY :               Light   touch, pinprick,   position  And  Vibration   IMPAIRED  IN                             GLOVE  AND  STOCKING  MANNER        E) REFLEXES:                   Deep  Tendon  Reflexes     DECREASED                    No  pathological  Reflexes  Bilaterally.                                   F) COORDINATION  AND  GAIT :                                Station and  Gait    SLOW                                Romberg 's test   POSITIVE                            Ataxia negative          ASSESSMENT:      Patient Active Problem List   Diagnosis    Type 2 diabetes mellitus (HCC)    Hypertension    CHF (congestive heart failure) (HCC)    Atrial fibrillation (HCC)    GERD (gastroesophageal reflux disease)    Chronic lumbar radiculopathy    Class 2 obesity due to excess calories without serious comorbidity with body mass index (BMI) of 35.0 to 35.9 in adult    Weakness of right lower extremity    Numbness and tingling    Polyneuropathy associated with underlying disease ([address]    H/O falling    Balance problem    Chronic cerebral ischemia    Memory problem    Bilateral carotid artery stenosis    Elevated hemoglobin A1c    Type 2 diabetes mellitus with diabetic polyneuropathy, without long-term current use of insulin ([address]     ULTRASOUND EVALUATION OF THE CAROTID ARTERIES       11/5/2020 COMPARISON:   09/13/2019       HISTORY:   ORDERING SYSTEM PROVIDED HISTORY: Essential hypertension       Left carotid stenosis, history of falling       FINDINGS:       RIGHT:       The right common carotid artery demonstrates peak systolic velocities of 76   and 53 cm/sec in the proximal and distal segments respectively. The right internal carotid artery demonstrates the systolic velocities of 62,   66, and 76 cm/sec in the proximal, mid and distal segments respectively. The external carotid artery is patent. The vertebral artery demonstrates   normal antegrade flow. No evidence of focal atherosclerotic plaque. ICA/CCA ratio of 1.1. LEFT:       The left common carotid artery demonstrates peak systolic velocities of 71   and 63 cm/sec in the proximal and distal segments respectively. The left internal carotid artery demonstrates the systolic velocities of 69,   68, and 83 cm/sec in the proximal, mid and distal segments respectively. The external carotid artery is patent. The vertebral artery demonstrates   normal antegrade flow. Mild hard plaque in the proximal internal carotid artery with measured   stenosis of up to 21%. ICA/CCA ratio of 1.3. Impression   The right internal carotid artery demonstrates 0-50% stenosis . The left internal carotid artery demonstrates 0-50% stenosis . Bilateral vertebral arteries are patent with flow in the normal direction. MRI OF THE BRAIN WITHOUT CONTRAST  9/13/2019 10:50 am       TECHNIQUE:   Multiplanar multisequence MRI of the brain was performed without the   administration of intravenous contrast.       COMPARISON:   None. HISTORY:   ORDERING SYSTEM PROVIDED HISTORY: H/O falling   TECHNOLOGIST PROVIDED HISTORY:   Reason for Exam:  Loss of feeling in right leg for one to two years.    Acuity: Chronic   Type of Exam: Unknown   Additional signs and symptoms: H/O falling; Balance problem; Chronic cerebral   ischemia; Memory problem. FINDINGS:   INTRACRANIAL STRUCTURES/VENTRICLES: The sellar and suprasellar structures,   optic chiasm, corpus callosum, pineal gland, tectum, and midline brainstem   structures are unremarkable. The craniocervical junction is unremarkable. There is no acute intracranial hemorrhage, mass effect, or midline shift. There is satisfactory overall gray-white matter differentiation. The   ventricular structures are symmetric and unremarkable. The infratentorial   structures including the cerebellopontine angles and internal auditory canals   are unremarkable. There is no abnormal restricted diffusion. There is no   abnormal blooming artifact on susceptibility weighted imaging. ORBITS: The visualized portion of the orbits demonstrate no acute abnormality. SINUSES: The visualized paranasal sinuses and mastoid air cells are well   aerated. BONES/SOFT TISSUES: The bone marrow signal intensity appears normal. The soft   tissues demonstrate no acute abnormality. Impression   Unremarkable MRI of the brain. ULTRASOUND EVALUATION OF THE CAROTID ARTERIES       9/13/2019       COMPARISON:   None       HISTORY:   ORDERING SYSTEM PROVIDED HISTORY: H/O falling   TECHNOLOGIST PROVIDED HISTORY:   Reason for Exam: memory problems   Acuity: Acute   Type of Exam: Initial   Relevant Medical/Surgical History: HTN, HLD and diabetes       FINDINGS:       RIGHT:       The right common carotid artery demonstrates peak systolic velocities of 66   and 55 cm/sec in the proximal and distal segments respectively. The right internal carotid artery demonstrates the systolic velocities of 32,   51, and 58 cm/sec in the proximal, mid and distal segments respectively. The external carotid artery is patent. The vertebral artery demonstrates   normal antegrade flow. Atherosclerotic disease is present at the bifurcation.        ICA/CCA ratio of 0.6. LEFT:       The left common carotid artery demonstrates peak systolic velocities of 87,   and 68 cm/sec in the proximal and distal segments respectively. The left internal carotid artery demonstrates the systolic velocities of 50,   97, and 78 cm/sec in the proximal, mid and distal segments respectively. The external carotid artery is patent. The vertebral artery demonstrates   normal antegrade flow. Atherosclerotic disease is present at the bifurcation. There is approximately 52% stenosis of left proximal internal carotid artery   secondary to irregular plaque. This is according to diameter measurement. ICA/CCA ratio of 0.6. Impression   1. The left internal carotid artery demonstrates 50-69% stenosis by diameter   measurement. 2. The right internal carotid artery demonstrates 0-50% stenosis. 3. Bilateral vertebral arteries are patent with flow in the normal direction. 4. Atherosclerotic disease at the bilateral carotid bifurcation. RECOMMENDATIONS:   Follow-up CTA neck. VISITING DIAGNOSIS:          ICD-10-CM    1. Polyneuropathy associated with underlying disease ([facility]  G63       2. Type 2 diabetes mellitus with diabetic polyneuropathy, without long-term current use of insulin ([facility])  E11.42       3. Chronic lumbar radiculopathy  M54.16       4. Bilateral carotid artery stenosis  I65.23       5. Weakness of right lower extremity  R29.898       6. Elevated hemoglobin A1c  R73.09       7. At[facility], unspecified type ([facility]  I48.91       8. Numbness and tingling  R20.0     R20.2       9. Chronic cerebral ischemia  I67.82       10. Class 2 obesity due to excess calories without serious comorbidity with body mass index (BMI) of 35.0 to 35.9 in adult  E66.09     Z68.35       11. H/O falling  Z91.81       12. Memory problem  R41.3       13. Balance problem  R26.89       14.  Stenosis of left carotid artery  I65.22 CONCERNS   &   INCREASED   RISK   FOR        * TIA,  CEREBRO  VASCULAR  ISCHEMIA,STROKE      *   COGNITIVE  &   MEMORY PROBLEMS  AND  DEMENTIAS       *  MONONEUROPATHIES,   RADICULOPATHY       *   PERIPHERAL  NEUROPATHY,   NEUROPATHIC  PAIN      *  GAIT  DIFFICULTIES  &   BALANCE PROBLMES   AND  FALL                VARIOUS  RISK   FACTORS   WERE  REVIEWED   AND   DISCUSSED. *  PATIENT   HAS  MULTIPLE   MEDICAL, NEUROLOGICAL      PROBLEMS . PATIENT'S   MANAGEMENT  IS  CHALLENGING. PLAN:                         Nehemiah Kai  Of  The  Diagnoses,  The  Management & Treatment  Options            AND    Care  plan  Were          Reviewed and   Discussed   With  patient. * Goals  And  Expectations  Of  The  Therapy  Discussed   And  Reviewed. *   Benefits   And   Side  Effect  Profile  Of  Medication/s   Were   Discussed                * Need   For  Further   Follow up For  The  Various  Problems Were  discussed. * Results  Of  The  Previous  Diagnostic tests were reviewed and  discussed                 Medical  Decision  Making  Was  Made  Based on the   Complexity  Of  Above  Mentioned  Diagnoses,    Data reviewed             And    Risk  Of  Significant   Co morbidities and   complicating   Factors. Medical  Decision  Was   High    Complexity   Due   To  The  Patient's  Multiple  Symptoms,      Advancing   Disease,  Complex  Treatment  Regimen,  Multiple medications           and   Risk  Of   Side  Effects,  Difficulty  In  Medication  Management  And  Diagnostic  Challenges       In  View  Of  The  Associated   Co  Morbid  Conditions   And  Problems. * FALL   PRECAUTIONS. THESE  REVIEWED   AND  DISCUSSED    *  USE   WALKING  ASSISTANCE  DEVICES     QUAD  CANE          *   ABSOLUTELY   NO  DRIVING      *   BE  CAREFUL  WITH  ACTIVITIES  .         *   AVOID   NECK  AND/ BACK  STRAINING  ACTIVITIES      *   TO WEAR   BILATERAL   WRIST  BRACES       *   TO  AVOID  PRESSURE  OVER   ULNAR  ASPECT   OF   ELBOWS        *   ADEQUATE   FLUIDINTAKE   AND  ELECTROLYTE  BALANCE             * KEEP  DAIRY  OF   THE  NEUROLOGICAL  SYMPTOMS        RECORDING THE    DURATION  AND  FREQUENCY. *  AVOID    CONDITIONS  AND  FACTORS   THAT  MAKE                  NEUROLOGICAL  SYMPTOMS  WORSE.                    *TO  MAINTAIN  REGULAR  SLEEP  WAKE  CYCLES. *   TO  HAVE  ADEQUATE  REST  AND   SLEEP    HOURS.          *    TO   AVOID   TO  SLEEP  IN   SUPINE  POSITION. *      WEIGHT   LOSS. *    AVOID  ANY USAGE OF    TOBACCO,              EXCESSIVE  ALCOHOL  AND   ILLEGAL   SUBSTANCES        *   Compliance   With  Medications   And  Instructions        * CURRENTLY    TOLERATING  THE  PRESCRIBED   MEDICATIONS. WITHOUT  ANY  SIGNIFICANT  SIDE  EFFECTS   &  GETTING BENEFIT. *    Prophylactic  Use   Of     Vitamin   B   Complex,  Folic  Acid,    Vitamin  B12    Multivitamin,       Calcium  With  magnesium  And  Vit D    Supplementations   Over  The  Counter  Discussed         *FOOT  CARE, DAILY  INSPECTION  OF  FEET   AND         PERIODIC  PODIATRY EVALUATIONS . *  PATIENT  IS  ALSO   COUNSELED   TO  KEEP    ACTIVITIES:         A)   SIMPLE      B)  ORGANIZED      C)  WRITEDOWN                       *  EVALUATIONS  AND  FOLLOW UP:     PERIODICALLY                 * PHYSICAL  THERAPY                                 *CARDIOLOGY              *   OPTHALMOLOGY               *   PULMONARY     AND   RENAL    CONSULTANTS                                                     *       H/O   CHRONIC       ATRIAL  FIBRILLATION                                         -   ON    ELIQUIS                                  BEING  FOLLOWED  BY   [facility]   [facility]                      *PATIENT   TO  FOLLOW  UP  WITH   PRIMARY  CARE         OTHER  CONSULTANTS  AS  BEFORE.             *  Maintain   Healthy  Life Style    With Periodic  Monitoring  Of      Any  Medical  Conditions  Including   Elevated  Blood  Pressure,  Lipid  Profile,     Blood  Sugar levels  AndHeart  Disease. *   Period   Screening  For  Cancers  Involving  Breast,  Colon,    lungs  And  Other  Organs  As  Applicable,  In consultation   With  Your  Primary Care Providers. *Second  Neurological  Opinion  And  Evaluations  In  [facility]  Setting  If  Patient  Is  Interested. * Please   Contact   Neurology  Clinic   Early   If   Are  Any  New  Neurological   And  Any neurological  Concerns. *  If  The  Patient remains  Neurologically  Stable   Return   To  [facility] Neurology Department   IN     3 -  6         MONTHS  TIME   FOR  FURTHER              FOLLOW UP.                           *   The  Neurological   Findings,  Possible  Diagnosis,  Differential diagnoses                    And  Options  For    Further   Investigations                   And  management   Are  Discussed  Comprehensively. Medications   And  Prescription   Risks  And  Side effects  Are   Also  Discussed. *  If   There is  Any  Significant  Worsening   Of  Current  Symptoms  And  Or                  If patient  Develops   Any additional  New  NeurologicalSymptoms                  Or  Significant  Concerns   Should  Call  911 or  Go  To  Emergency  Department                     For  Further  Immediate  Evaluation. The   Above  Were  Reviewed  With  patient   and                       questions  Answered  In  Detail. More   Than  50% of face  To face Time   Was  Spent  On  Counseling                    And   Coordination  Of  Care   Of   Patient's  multiple   Neurological  Problems                         And   Comorbid  Medical   Conditions.             Electronically signed by Clarence Lantigua MD.,   [facility]      Board Certified in  Neurology &  In  [facility] (AB[facility])      DISCLAIMER:   Although every effort was made to ensure the accuracy of this  electronictranscription, some errors in transcription may have occurred. GENERAL PATIENT INSTRUCTIONS:     A Healthy Lifestyle: Care Instructions  Your Care Instructions  A healthy lifestyle can help you feel good, stay at ahealthy weight, and have plenty of energy for both work and play. A healthy lifestyle is something you can share with your whole family. A healthy lifestyle also can lower your risk for serious health problems, such ashigh blood pressure, heart disease, and diabetes. You can follow a few steps listed below to improve your health and the health of your family. Follow-up careis a key part of your treatment and safety. Be sure to make and go to all appointments, and call your doctor if you are having problems. Its also a good idea to know your test results and keep a list of the medicines you take. How can you care for yourself at home? Do not eat too much sugar, fat, or fast foods. You can still have dessert and treats nowand then. The goal is moderation. Start small to improve your eating habits. Pay attention to portion sizes, drink less juice and soda pop, and eat more fruits and vegetables. Eat a healthy amount of food. A 3-ounce serving of meat, for example, is about the size of a deck of cards. Fill the rest of your plate with vegetables and whole grains. Limit theamount of soda and sports drinks you have every day. Drink more water when you are thirsty. Eat at least 5 servings of fruits and vegetables every day. It may seem like a lot, but it is not hard to reach this goal. Aserving or helping is 1 piece of fruit, 1 cup of vegetables, or 2 cups of leafy, raw vegetables. Have an apple or some carrot sticks as an afternoon snack instead of a candy bar.  Try to have fruits and/or vegetables at everymeal.  Make exercise part of your daily routine. You may want to start with simple activities, such as walking, bicycling, or slow swimming. Try aislinn active 30 to 60 minutes every day. You do not need to do all 30 to 60 minutes all at once. For example, you can exercise 3 times a day for 10 or 20 minutes. Moderate exercise is safe for most people, but it is always agood idea to talk to your doctor before starting an exercise program.  Keep moving. [place] the lawn, work in the garden, or [employer]. Take the stairs instead of the elevator at work. If you smoke, quit. Peoplewho smoke have an increased risk for heart attack, stroke, cancer, and other lung illnesses. Quitting is hard, but there are ways to boost your chance of quitting tobacco for good. Use nicotine gum, patches, or lozenges. Ask your doctor about stop-smoking programs and medicines. Keep trying. In addition to reducing your risk of diseases in the future, you will notice some benefits soon after you stop using tobacco. If you have shortness of breath or asthma symptoms, they will likely getbetter within a few weeks after you quit. Limit how much alcohol you drink. Moderate amounts of alcohol (up to 2 drinks a day for men, 1drink a day for women) are okay. But drinking too much can lead to liver problems, high blood pressure, and other health problems. health  If you have a family, there are many things you can do together to improve your health. Eat meals together as a family as often as possible. Eat healthy foods. This includes fruits, vegetables, lean meats and dairy, and whole grains. Include your family in your fitness plan. Most peoplethink of activities such as jogging or tennis as the way to fitness, but there are many ways you and your family can be more active. Anything that makes you breathe hard and gets your heart pumping is exercise. Here are sometips:  Walk to do errands or to take your child to school or the bus.   Go for a family bike ride after dinner instead of watching TV. Where can you learn more? Go totps://chperodrieb.healthMonetate. org and sign in to your [employer] account. Enter O545 in the Search HealthInformation box to learn more about \"A Healthy Lifestyle: Care Instructions. \"     If you do not have anaccount, please click on the \"Sign Up Now\" link. Current as of: July 26, 2016  Content Version: 11.2  © 0463-7138 [employer]. Care instructions adapted under license by [facility] ([facility]). If you have questions about a medical condition or this instruction, always ask your healthcare professional. [employer] disclaims any warranty or liability for your use of this information.

## 2023-05-25 ENCOUNTER — OFFICE VISIT (OUTPATIENT)
Dept: NEUROLOGY | Age: 78
End: 2023-05-25
Payer: MEDICARE

## 2023-05-25 VITALS
DIASTOLIC BLOOD PRESSURE: 70 MMHG | OXYGEN SATURATION: 98 % | HEART RATE: 74 BPM | BODY MASS INDEX: 35.92 KG/M2 | SYSTOLIC BLOOD PRESSURE: 112 MMHG | WEIGHT: 237 LBS | HEIGHT: 68 IN

## 2023-05-25 DIAGNOSIS — I67.82 CHRONIC CEREBRAL ISCHEMIA: ICD-10-CM

## 2023-05-25 DIAGNOSIS — E66.09 CLASS 2 OBESITY DUE TO EXCESS CALORIES WITHOUT SERIOUS COMORBIDITY WITH BODY MASS INDEX (BMI) OF 35.0 TO 35.9 IN ADULT: ICD-10-CM

## 2023-05-25 DIAGNOSIS — G63 POLYNEUROPATHY ASSOCIATED WITH UNDERLYING DISEASE (HCC): Primary | ICD-10-CM

## 2023-05-25 DIAGNOSIS — I65.22 STENOSIS OF LEFT CAROTID ARTERY: ICD-10-CM

## 2023-05-25 DIAGNOSIS — M54.16 CHRONIC LUMBAR RADICULOPATHY: ICD-10-CM

## 2023-05-25 DIAGNOSIS — R20.2 NUMBNESS AND TINGLING: ICD-10-CM

## 2023-05-25 DIAGNOSIS — R26.89 BALANCE PROBLEM: ICD-10-CM

## 2023-05-25 DIAGNOSIS — E11.42 DIABETIC PERIPHERAL NEUROPATHY ASSOCIATED WITH TYPE 2 DIABETES MELLITUS (HCC): ICD-10-CM

## 2023-05-25 DIAGNOSIS — R29.898 WEAKNESS OF RIGHT LOWER EXTREMITY: ICD-10-CM

## 2023-05-25 DIAGNOSIS — I65.23 BILATERAL CAROTID ARTERY STENOSIS: ICD-10-CM

## 2023-05-25 DIAGNOSIS — R20.0 NUMBNESS AND TINGLING: ICD-10-CM

## 2023-05-25 DIAGNOSIS — I48.91 ATRIAL FIBRILLATION, UNSPECIFIED TYPE (HCC): ICD-10-CM

## 2023-05-25 DIAGNOSIS — R73.09 ELEVATED HEMOGLOBIN A1C: ICD-10-CM

## 2023-05-25 DIAGNOSIS — I10 PRIMARY HYPERTENSION: ICD-10-CM

## 2023-05-25 DIAGNOSIS — Z91.81 H/O FALLING: ICD-10-CM

## 2023-05-25 DIAGNOSIS — R41.3 MEMORY PROBLEM: ICD-10-CM

## 2023-05-25 PROCEDURE — G8417 CALC BMI ABV UP PARAM F/U: HCPCS | Performed by: PSYCHIATRY & NEUROLOGY

## 2023-05-25 PROCEDURE — 3078F DIAST BP <80 MM HG: CPT | Performed by: PSYCHIATRY & NEUROLOGY

## 2023-05-25 PROCEDURE — 99214 OFFICE O/P EST MOD 30 MIN: CPT | Performed by: PSYCHIATRY & NEUROLOGY

## 2023-05-25 PROCEDURE — 1036F TOBACCO NON-USER: CPT | Performed by: PSYCHIATRY & NEUROLOGY

## 2023-05-25 PROCEDURE — G8427 DOCREV CUR MEDS BY ELIG CLIN: HCPCS | Performed by: PSYCHIATRY & NEUROLOGY

## 2023-05-25 PROCEDURE — 3074F SYST BP LT 130 MM HG: CPT | Performed by: PSYCHIATRY & NEUROLOGY

## 2023-05-25 PROCEDURE — 1123F ACP DISCUSS/DSCN MKR DOCD: CPT | Performed by: PSYCHIATRY & NEUROLOGY

## 2023-05-25 ASSESSMENT — ENCOUNTER SYMPTOMS
SHORTNESS OF BREATH: 0
SORE THROAT: 0
CHEST TIGHTNESS: 0
TROUBLE SWALLOWING: 0
WHEEZING: 0
CONSTIPATION: 0
APNEA: 0
BOWEL INCONTINENCE: 0
VOICE CHANGE: 0
CHOKING: 0
EYE ITCHING: 0
BACK PAIN: 1
FACIAL SWELLING: 0
EYE DISCHARGE: 0
COUGH: 0
EYE PAIN: 0
NAUSEA: 0
BLOOD IN STOOL: 0
VISUAL CHANGE: 0
SINUS PRESSURE: 0
PHOTOPHOBIA: 0
EYE REDNESS: 0
VOMITING: 0
DIARRHEA: 0
ABDOMINAL PAIN: 0
COLOR CHANGE: 0
ABDOMINAL DISTENTION: 0

## 2023-05-25 NOTE — PROGRESS NOTES
minutes all at once. For example, you can exercise 3 times a day for 10 or 20 minutes. Moderate exercise is safe for most people, but it is always agood idea to talk to your doctor before starting an exercise program.  Keep moving. [employer] the lawn, work in the garden, or [employer]. Take the stairs instead of the elevator at work. If you smoke, quit. Peoplewho smoke have an increased risk for heart attack, stroke, cancer, and other lung illnesses. Quitting is hard, but there are ways to boost your chance of quitting tobacco for good. Use nicotine gum, patches, or lozenges. Ask your doctor about stop-smoking programs and medicines. Keep trying. In addition to reducing your risk of diseases in the future, you will notice some benefits soon after you stop using tobacco. If you have shortness of breath or asthma symptoms, they will likely getbetter within a few weeks after you quit. Limit how much alcohol you drink. Moderate amounts of alcohol (up to 2 drinks a day for men, 1drink a day for women) are okay. But drinking too much can lead to liver problems, high blood pressure, and other health problems. health  If you have a family, there are many things you can do together to improve your health. Eat meals together as a family as often as possible. Eat healthy foods. This includes fruits, vegetables, lean meats and dairy, and whole grains. Include your family in your fitness plan. Most peoplethink of activities such as jogging or tennis as the way to fitness, but there are many ways you and your family can be more active. Anything that makes you breathe hard and gets your heart pumping is exercise. Here are sometips:  Walk to do errands or to take your child to school or the bus. Go for a family bike ride after dinner instead of watching TV. Where can you learn more? Go totps://chboyeb.healthForgame. org and sign in to your [employer] account.  Enter K143 in the Search HealthInformation box to learn

## 2023-11-16 ENCOUNTER — OFFICE VISIT (OUTPATIENT)
Dept: NEUROLOGY | Age: 78
End: 2023-11-16
Payer: MEDICARE

## 2023-11-16 VITALS
WEIGHT: 242 LBS | HEIGHT: 68 IN | HEART RATE: 50 BPM | BODY MASS INDEX: 36.68 KG/M2 | OXYGEN SATURATION: 98 % | DIASTOLIC BLOOD PRESSURE: 74 MMHG | SYSTOLIC BLOOD PRESSURE: 122 MMHG

## 2023-11-16 DIAGNOSIS — R20.0 NUMBNESS AND TINGLING: ICD-10-CM

## 2023-11-16 DIAGNOSIS — Z91.81 H/O FALLING: ICD-10-CM

## 2023-11-16 DIAGNOSIS — R73.09 ELEVATED HEMOGLOBIN A1C: ICD-10-CM

## 2023-11-16 DIAGNOSIS — E11.42 TYPE 2 DIABETES MELLITUS WITH DIABETIC POLYNEUROPATHY, WITHOUT LONG-TERM CURRENT USE OF INSULIN (HCC): ICD-10-CM

## 2023-11-16 DIAGNOSIS — R20.2 NUMBNESS AND TINGLING: ICD-10-CM

## 2023-11-16 DIAGNOSIS — G63 POLYNEUROPATHY ASSOCIATED WITH UNDERLYING DISEASE (HCC): Primary | ICD-10-CM

## 2023-11-16 DIAGNOSIS — I65.22 STENOSIS OF LEFT CAROTID ARTERY: ICD-10-CM

## 2023-11-16 DIAGNOSIS — E66.09 CLASS 2 OBESITY DUE TO EXCESS CALORIES WITHOUT SERIOUS COMORBIDITY WITH BODY MASS INDEX (BMI) OF 35.0 TO 35.9 IN ADULT: ICD-10-CM

## 2023-11-16 DIAGNOSIS — I67.82 CHRONIC CEREBRAL ISCHEMIA: ICD-10-CM

## 2023-11-16 DIAGNOSIS — R29.898 WEAKNESS OF RIGHT LOWER EXTREMITY: ICD-10-CM

## 2023-11-16 DIAGNOSIS — E11.42 DIABETIC PERIPHERAL NEUROPATHY ASSOCIATED WITH TYPE 2 DIABETES MELLITUS (HCC): ICD-10-CM

## 2023-11-16 DIAGNOSIS — I65.23 BILATERAL CAROTID ARTERY STENOSIS: ICD-10-CM

## 2023-11-16 DIAGNOSIS — I10 PRIMARY HYPERTENSION: ICD-10-CM

## 2023-11-16 DIAGNOSIS — M54.16 CHRONIC LUMBAR RADICULOPATHY: ICD-10-CM

## 2023-11-16 DIAGNOSIS — R26.89 BALANCE PROBLEM: ICD-10-CM

## 2023-11-16 DIAGNOSIS — I48.91 ATRIAL FIBRILLATION, UNSPECIFIED TYPE (HCC): ICD-10-CM

## 2023-11-16 PROCEDURE — 3074F SYST BP LT 130 MM HG: CPT | Performed by: PSYCHIATRY & NEUROLOGY

## 2023-11-16 PROCEDURE — 1036F TOBACCO NON-USER: CPT | Performed by: PSYCHIATRY & NEUROLOGY

## 2023-11-16 PROCEDURE — G8427 DOCREV CUR MEDS BY ELIG CLIN: HCPCS | Performed by: PSYCHIATRY & NEUROLOGY

## 2023-11-16 PROCEDURE — G8484 FLU IMMUNIZE NO ADMIN: HCPCS | Performed by: PSYCHIATRY & NEUROLOGY

## 2023-11-16 PROCEDURE — 99214 OFFICE O/P EST MOD 30 MIN: CPT | Performed by: PSYCHIATRY & NEUROLOGY

## 2023-11-16 PROCEDURE — 1123F ACP DISCUSS/DSCN MKR DOCD: CPT | Performed by: PSYCHIATRY & NEUROLOGY

## 2023-11-16 PROCEDURE — G8417 CALC BMI ABV UP PARAM F/U: HCPCS | Performed by: PSYCHIATRY & NEUROLOGY

## 2023-11-16 PROCEDURE — 3078F DIAST BP <80 MM HG: CPT | Performed by: PSYCHIATRY & NEUROLOGY

## 2023-11-16 ASSESSMENT — ENCOUNTER SYMPTOMS
CHEST TIGHTNESS: 0
VOMITING: 0
TROUBLE SWALLOWING: 0
ABDOMINAL DISTENTION: 0
VOICE CHANGE: 0
BLOOD IN STOOL: 0
ABDOMINAL PAIN: 0
CONSTIPATION: 0
BACK PAIN: 1
SINUS PRESSURE: 0
VISUAL CHANGE: 0
COUGH: 0
EYE ITCHING: 0
BOWEL INCONTINENCE: 0
SORE THROAT: 0
COLOR CHANGE: 0
FACIAL SWELLING: 0
EYE PAIN: 0
CHOKING: 0
SHORTNESS OF BREATH: 0
APNEA: 0
PHOTOPHOBIA: 0
WHEEZING: 0
EYE REDNESS: 0
NAUSEA: 0
DIARRHEA: 0
EYE DISCHARGE: 0

## 2024-05-16 ENCOUNTER — OFFICE VISIT (OUTPATIENT)
Dept: NEUROLOGY | Age: 79
End: 2024-05-16
Payer: MEDICARE

## 2024-05-16 VITALS
OXYGEN SATURATION: 96 % | DIASTOLIC BLOOD PRESSURE: 68 MMHG | RESPIRATION RATE: 16 BRPM | SYSTOLIC BLOOD PRESSURE: 126 MMHG | HEART RATE: 62 BPM | WEIGHT: 221 LBS | BODY MASS INDEX: 33.6 KG/M2

## 2024-05-16 DIAGNOSIS — M54.16 CHRONIC LUMBAR RADICULOPATHY: ICD-10-CM

## 2024-05-16 DIAGNOSIS — R26.89 BALANCE PROBLEM: ICD-10-CM

## 2024-05-16 DIAGNOSIS — R20.0 NUMBNESS AND TINGLING: ICD-10-CM

## 2024-05-16 DIAGNOSIS — R41.3 MEMORY PROBLEM: ICD-10-CM

## 2024-05-16 DIAGNOSIS — Z91.81 H/O FALLING: ICD-10-CM

## 2024-05-16 DIAGNOSIS — R73.09 ELEVATED HEMOGLOBIN A1C: ICD-10-CM

## 2024-05-16 DIAGNOSIS — E11.42 DIABETIC PERIPHERAL NEUROPATHY ASSOCIATED WITH TYPE 2 DIABETES MELLITUS (HCC): ICD-10-CM

## 2024-05-16 DIAGNOSIS — R26.9 GAIT DIFFICULTY: Primary | ICD-10-CM

## 2024-05-16 DIAGNOSIS — I65.23 BILATERAL CAROTID ARTERY STENOSIS: ICD-10-CM

## 2024-05-16 DIAGNOSIS — G63 POLYNEUROPATHY ASSOCIATED WITH UNDERLYING DISEASE (HCC): ICD-10-CM

## 2024-05-16 DIAGNOSIS — I65.22 STENOSIS OF LEFT CAROTID ARTERY: ICD-10-CM

## 2024-05-16 DIAGNOSIS — I48.91 ATRIAL FIBRILLATION, UNSPECIFIED TYPE (HCC): ICD-10-CM

## 2024-05-16 DIAGNOSIS — R20.2 NUMBNESS AND TINGLING: ICD-10-CM

## 2024-05-16 DIAGNOSIS — I67.82 CHRONIC CEREBRAL ISCHEMIA: ICD-10-CM

## 2024-05-16 PROCEDURE — 99214 OFFICE O/P EST MOD 30 MIN: CPT | Performed by: PSYCHIATRY & NEUROLOGY

## 2024-05-16 RX ORDER — SEMAGLUTIDE 0.68 MG/ML
0.5 INJECTION, SOLUTION SUBCUTANEOUS WEEKLY
COMMUNITY
Start: 2024-03-26

## 2024-05-16 RX ORDER — AMIODARONE HYDROCHLORIDE 200 MG/1
200 TABLET ORAL DAILY
COMMUNITY
Start: 2024-04-08

## 2024-05-16 RX ORDER — SEMAGLUTIDE 1.34 MG/ML
1 INJECTION, SOLUTION SUBCUTANEOUS WEEKLY
COMMUNITY
Start: 2024-05-03

## 2024-05-16 ASSESSMENT — ENCOUNTER SYMPTOMS
CHEST TIGHTNESS: 0
VOICE CHANGE: 0
APNEA: 0
SHORTNESS OF BREATH: 0
WHEEZING: 0
TROUBLE SWALLOWING: 0
SORE THROAT: 0
COUGH: 0
BACK PAIN: 1
SINUS PRESSURE: 0
FACIAL SWELLING: 0

## 2024-05-16 ASSESSMENT — PATIENT HEALTH QUESTIONNAIRE - PHQ9
SUM OF ALL RESPONSES TO PHQ QUESTIONS 1-9: 0
1. LITTLE INTEREST OR PLEASURE IN DOING THINGS: NOT AT ALL
SUM OF ALL RESPONSES TO PHQ QUESTIONS 1-9: 0
SUM OF ALL RESPONSES TO PHQ QUESTIONS 1-9: 0
SUM OF ALL RESPONSES TO PHQ9 QUESTIONS 1 & 2: 0
SUM OF ALL RESPONSES TO PHQ QUESTIONS 1-9: 0
2. FEELING DOWN, DEPRESSED OR HOPELESS: NOT AT ALL

## 2024-05-16 NOTE — PROGRESS NOTES
tingling  R20.0     R20.2       12. Memory problem  R41.3       13. Bilateral carotid artery stenosis  I65.23                      CONCERNS   &   INCREASED   RISK   FOR        * TIA,  CEREBRO  VASCULAR  ISCHEMIA,STROKE      *   COGNITIVE  &   MEMORY PROBLEMS  AND  DEMENTIAS       *  MONONEUROPATHIES,   RADICULOPATHY       *   PERIPHERAL  NEUROPATHY,   NEUROPATHIC  PAIN      *  GAIT  DIFFICULTIES  &   BALANCE PROBLMES   AND  FALL                VARIOUS  RISK   FACTORS   WERE  REVIEWED   AND   DISCUSSED.        *  PATIENT   HAS  MULTIPLE   MEDICAL, NEUROLOGICAL      PROBLEMS .         PATIENT'S   MANAGEMENT  IS  CHALLENGING.            PLAN:                         *Nature  Of  The  Diagnoses,  The  Management & Treatment  Options            AND    Care  plan  Were          Reviewed and   Discussed   With  patient.           * Goals  And  Expectations  Of  The  Therapy  Discussed   And  Reviewed.          *   Benefits   And   Side  Effect  Profile  Of  Medication/s   Were   Discussed                * Need   For  Further   Follow up For  The  Various  Problems Were  discussed.          * Results  Of  The  Previous  Diagnostic tests were reviewed and  discussed                 Medical  Decision  Making  Was  Made  Based on the   Complexity  Of  Above  Mentioned  Diagnoses,    Data reviewed             And    Risk  Of  Significant   Co morbidities and   complicating   Factors.                 Medical  Decision  Was   High    Complexity   Due   To  The  Patient's  Multiple  Symptoms,      Advancing   Disease,  Complex  Treatment  Regimen,  Multiple medications           and   Risk  Of   Side  Effects,  Difficulty  In  Medication  Management  And  Diagnostic  Challenges       In  View  Of  The  Associated   Co  Morbid  Conditions   And  Problems.                           * FALL   PRECAUTIONS.      THESE  REVIEWED   AND  DISCUSSED      *  USE   WALKING  ASSISTANCE  DEVICES     QUAD  CANE          *   BE  CAREFUL  WITH

## 2024-05-29 ENCOUNTER — OFFICE VISIT (OUTPATIENT)
Dept: PODIATRY | Age: 79
End: 2024-05-29
Payer: MEDICARE

## 2024-05-29 ENCOUNTER — HOSPITAL ENCOUNTER (OUTPATIENT)
Dept: GENERAL RADIOLOGY | Age: 79
Discharge: HOME | End: 2024-05-31
Payer: MEDICARE

## 2024-05-29 VITALS
HEART RATE: 78 BPM | SYSTOLIC BLOOD PRESSURE: 124 MMHG | DIASTOLIC BLOOD PRESSURE: 80 MMHG | HEIGHT: 68 IN | WEIGHT: 224 LBS | BODY MASS INDEX: 33.95 KG/M2

## 2024-05-29 DIAGNOSIS — M77.8 CAPSULITIS OF LEFT FOOT: ICD-10-CM

## 2024-05-29 DIAGNOSIS — E11.42 TYPE 2 DIABETES MELLITUS WITH DIABETIC POLYNEUROPATHY, WITHOUT LONG-TERM CURRENT USE OF INSULIN (HCC): ICD-10-CM

## 2024-05-29 DIAGNOSIS — M79.672 FOOT PAIN, LEFT: ICD-10-CM

## 2024-05-29 DIAGNOSIS — G63 POLYNEUROPATHY ASSOCIATED WITH UNDERLYING DISEASE (HCC): Primary | ICD-10-CM

## 2024-05-29 DIAGNOSIS — G63 POLYNEUROPATHY ASSOCIATED WITH UNDERLYING DISEASE (HCC): ICD-10-CM

## 2024-05-29 PROCEDURE — 3079F DIAST BP 80-89 MM HG: CPT | Performed by: PODIATRIST

## 2024-05-29 PROCEDURE — 1036F TOBACCO NON-USER: CPT | Performed by: PODIATRIST

## 2024-05-29 PROCEDURE — G8427 DOCREV CUR MEDS BY ELIG CLIN: HCPCS | Performed by: PODIATRIST

## 2024-05-29 PROCEDURE — 99204 OFFICE O/P NEW MOD 45 MIN: CPT | Performed by: PODIATRIST

## 2024-05-29 PROCEDURE — 1123F ACP DISCUSS/DSCN MKR DOCD: CPT | Performed by: PODIATRIST

## 2024-05-29 PROCEDURE — 99214 OFFICE O/P EST MOD 30 MIN: CPT | Performed by: PODIATRIST

## 2024-05-29 PROCEDURE — 3074F SYST BP LT 130 MM HG: CPT | Performed by: PODIATRIST

## 2024-05-29 PROCEDURE — G8417 CALC BMI ABV UP PARAM F/U: HCPCS | Performed by: PODIATRIST

## 2024-05-29 PROCEDURE — 73630 X-RAY EXAM OF FOOT: CPT

## 2024-05-29 NOTE — PROGRESS NOTES
Subjective:  Sourav Carl is a 79 y.o. male who presents to the office today complaining of pain in the ball of the Left foot.  Symptoms began  week(s) ago.  Seem to get worse just recently.  Patient relates pain is Present.  Pain is rated 3 out of 10 and is described as waxing and waning.  Treatments prior to today's visit include: None.  Patient also has neuropathic symptoms over time..  Currently denies F/C/N/V.     Allergies   Allergen Reactions    Albuterol Palpitations       Past Medical History:   Diagnosis Date    Atrial fibrillation (HCC)     Cancer of kidney (HCC)     Stage 3    CHF (congestive heart failure) (HCC) 2018    GERD (gastroesophageal reflux disease)     Hypertension     Type 2 diabetes mellitus (HCC)        Prior to Admission medications    Medication Sig Start Date End Date Taking? Authorizing Provider   OZEMPIC, 0.25 OR 0.5 MG/DOSE, 2 MG/3ML SOPN 0.5 mg Once a week at 5 PM 3/26/24  Yes Provider, MD Thom   OZEMPIC, 1 MG/DOSE, 4 MG/3ML SOPN sc injection 1 mg Once a week at 5 PM 5/3/24  Yes Provider, Historical, MD   amiodarone (CORDARONE) 200 MG tablet Take 1 tablet by mouth daily 4/8/24  Yes Provider, Historical, MD   potassium chloride (KLOR-CON) 20 MEQ packet dissolve 2 packets in 4 ounce(s) OF WATER AND DRINK DAILY 10/24/22  Yes Provider, MD Thom   acetaZOLAMIDE (DIAMOX) 250 MG tablet Take 1 tablet by mouth 3/21/22  Yes Provider, Historical, MD   bumetanide (BUMEX) 1 MG tablet take 2 tablets by mouth every morning then 2 tablets IN THE AFTERNOON 3/21/22  Yes Provider, Historical, MD   amLODIPine (NORVASC) 10 MG tablet Take 0.5 tablets by mouth 3/21/22  Yes Provider, Historical, MD   carvedilol (COREG) 3.125 MG tablet take 1 tablet by mouth twice a day with food 3/21/22  Yes Provider, MD TREVOR Tomas ELLIPTA 200-62.5-25 MCG/INH AEPB inhale 1 puff by mouth every 24 hours 4/11/22  Yes Provider, MD Thom   isosorbide dinitrate (ISORDIL) 10 MG tablet Take 1 tablet